# Patient Record
Sex: FEMALE | Employment: OTHER | URBAN - METROPOLITAN AREA
[De-identification: names, ages, dates, MRNs, and addresses within clinical notes are randomized per-mention and may not be internally consistent; named-entity substitution may affect disease eponyms.]

---

## 2018-11-27 ENCOUNTER — RECORDS - HEALTHEAST (OUTPATIENT)
Dept: LAB | Facility: CLINIC | Age: 68
End: 2018-11-27

## 2018-11-27 LAB
ANION GAP SERPL CALCULATED.3IONS-SCNC: 10 MMOL/L (ref 5–18)
BUN SERPL-MCNC: 16 MG/DL (ref 8–22)
CALCIUM SERPL-MCNC: 9.2 MG/DL (ref 8.5–10.5)
CHLORIDE BLD-SCNC: 101 MMOL/L (ref 98–107)
CO2 SERPL-SCNC: 25 MMOL/L (ref 22–31)
CREAT SERPL-MCNC: 1.05 MG/DL (ref 0.6–1.1)
GFR SERPL CREATININE-BSD FRML MDRD: 52 ML/MIN/1.73M2
GLUCOSE BLD-MCNC: 156 MG/DL (ref 70–125)
HGB BLD-MCNC: 10.7 G/DL (ref 12–16)
POTASSIUM BLD-SCNC: 4 MMOL/L (ref 3.5–5)
SODIUM SERPL-SCNC: 136 MMOL/L (ref 136–145)

## 2018-12-10 ENCOUNTER — RECORDS - HEALTHEAST (OUTPATIENT)
Dept: LAB | Facility: CLINIC | Age: 68
End: 2018-12-10

## 2018-12-10 LAB
ALBUMIN UR-MCNC: NEGATIVE MG/DL
APPEARANCE UR: CLEAR
BACTERIA #/AREA URNS HPF: ABNORMAL HPF
BILIRUB UR QL STRIP: NEGATIVE
COLOR UR AUTO: YELLOW
GLUCOSE UR STRIP-MCNC: NEGATIVE MG/DL
HGB UR QL STRIP: NEGATIVE
KETONES UR STRIP-MCNC: NEGATIVE MG/DL
LEUKOCYTE ESTERASE UR QL STRIP: ABNORMAL
NITRATE UR QL: NEGATIVE
PH UR STRIP: 6.5 [PH] (ref 4.5–8)
RBC #/AREA URNS AUTO: ABNORMAL HPF
SP GR UR STRIP: 1.01 (ref 1–1.03)
SQUAMOUS #/AREA URNS AUTO: ABNORMAL LPF
UROBILINOGEN UR STRIP-ACNC: ABNORMAL
WBC #/AREA URNS AUTO: ABNORMAL HPF

## 2018-12-12 LAB — BACTERIA SPEC CULT: NO GROWTH

## 2021-04-28 ENCOUNTER — TELEPHONE (OUTPATIENT)
Dept: NEUROSURGERY | Facility: CLINIC | Age: 71
End: 2021-04-28

## 2021-04-28 NOTE — TELEPHONE ENCOUNTER
Patient states she is a former patient of Dr. Truong from [facility], looking to schedule an appointment for second opinion on a surgery recommendation.

## 2021-05-04 ENCOUNTER — OFFICE VISIT (OUTPATIENT)
Dept: NEUROSURGERY | Facility: CLINIC | Age: 71
End: 2021-05-04
Attending: PHYSICIAN ASSISTANT
Payer: MEDICARE

## 2021-05-04 VITALS
HEART RATE: 84 BPM | BODY MASS INDEX: 44.57 KG/M2 | OXYGEN SATURATION: 98 % | WEIGHT: 227 LBS | DIASTOLIC BLOOD PRESSURE: 80 MMHG | SYSTOLIC BLOOD PRESSURE: 181 MMHG | HEIGHT: 60 IN

## 2021-05-04 DIAGNOSIS — E66.01 MORBID OBESITY (H): ICD-10-CM

## 2021-05-04 DIAGNOSIS — M48.02 CERVICAL SPINAL STENOSIS: Primary | ICD-10-CM

## 2021-05-04 PROCEDURE — 99203 OFFICE O/P NEW LOW 30 MIN: CPT | Performed by: PHYSICIAN ASSISTANT

## 2021-05-04 PROCEDURE — G0463 HOSPITAL OUTPT CLINIC VISIT: HCPCS

## 2021-05-04 RX ORDER — IRON,CARBONYL/ASCORBIC ACID 100-250 MG
1-2 TABLET ORAL
COMMUNITY
End: 2022-01-01

## 2021-05-04 RX ORDER — BUPROPION HYDROCHLORIDE 300 MG/1
300 TABLET ORAL EVERY MORNING
COMMUNITY

## 2021-05-04 RX ORDER — CITALOPRAM HYDROBROMIDE 20 MG/1
40 TABLET ORAL DAILY
COMMUNITY
Start: 2021-01-28

## 2021-05-04 RX ORDER — PRAVASTATIN SODIUM 40 MG
40 TABLET ORAL
COMMUNITY
Start: 2020-09-11 | End: 2022-01-01

## 2021-05-04 RX ORDER — SODIUM FLUORIDE 5 MG/G
GEL, DENTIFRICE DENTAL
COMMUNITY
Start: 2020-07-05

## 2021-05-04 RX ORDER — TRIAMCINOLONE ACETONIDE 1 MG/G
CREAM TOPICAL
COMMUNITY
Start: 2020-10-29 | End: 2022-01-01

## 2021-05-04 RX ORDER — NORETHINDRONE ACETATE 5 MG
TABLET ORAL
COMMUNITY
Start: 2019-12-24 | End: 2022-01-01

## 2021-05-04 RX ORDER — ROSUVASTATIN CALCIUM 5 MG/1
5 TABLET, COATED ORAL DAILY
Status: ON HOLD | COMMUNITY
Start: 2020-10-06 | End: 2022-01-01

## 2021-05-04 RX ORDER — SPIRONOLACTONE 25 MG/1
25 TABLET ORAL DAILY
COMMUNITY
Start: 2020-10-14 | End: 2022-01-01

## 2021-05-04 RX ORDER — OXYCODONE HYDROCHLORIDE 5 MG/1
2.5-5 TABLET ORAL EVERY 6 HOURS PRN
Status: ON HOLD | COMMUNITY
Start: 2021-02-26 | End: 2022-01-01

## 2021-05-04 RX ORDER — FUROSEMIDE 20 MG
40 TABLET ORAL
COMMUNITY
Start: 2021-02-24 | End: 2022-01-01

## 2021-05-04 RX ORDER — NYSTATIN 100000 U/G
CREAM TOPICAL DAILY PRN
COMMUNITY
Start: 2020-07-02

## 2021-05-04 RX ORDER — FLUCONAZOLE 150 MG/1
TABLET ORAL
COMMUNITY
Start: 2021-02-11 | End: 2022-01-01

## 2021-05-04 RX ORDER — LANCETS 30 GAUGE
1 EACH MISCELLANEOUS
COMMUNITY
Start: 2020-10-12

## 2021-05-04 RX ORDER — BUDESONIDE AND FORMOTEROL FUMARATE DIHYDRATE 160; 4.5 UG/1; UG/1
AEROSOL RESPIRATORY (INHALATION)
COMMUNITY
Start: 2020-08-08

## 2021-05-04 RX ORDER — OMEPRAZOLE 40 MG/1
CAPSULE, DELAYED RELEASE ORAL
COMMUNITY
Start: 2021-01-28 | End: 2022-01-01

## 2021-05-04 RX ORDER — ALPRAZOLAM 0.25 MG
0.25 TABLET ORAL
COMMUNITY
End: 2022-01-01

## 2021-05-04 RX ORDER — BLOOD-GLUCOSE METER
EACH MISCELLANEOUS
COMMUNITY
Start: 2020-10-12

## 2021-05-04 RX ORDER — BUPROPION HYDROCHLORIDE 150 MG/1
150 TABLET, EXTENDED RELEASE ORAL
COMMUNITY
Start: 2020-07-15 | End: 2022-01-01

## 2021-05-04 RX ORDER — IBUPROFEN 200 MG
200 TABLET ORAL
COMMUNITY
End: 2022-01-01

## 2021-05-04 RX ORDER — AMOXICILLIN 250 MG
2 CAPSULE ORAL DAILY PRN
COMMUNITY
Start: 2020-11-19

## 2021-05-04 RX ORDER — LEVALBUTEROL TARTRATE 45 UG/1
1-2 AEROSOL, METERED ORAL
COMMUNITY
Start: 2020-08-12 | End: 2022-01-01

## 2021-05-04 RX ORDER — ACETAMINOPHEN 500 MG
1000 TABLET ORAL
COMMUNITY
End: 2022-01-01

## 2021-05-04 RX ORDER — NYSTATIN 100000/ML
175 SUSPENSION, ORAL (FINAL DOSE FORM) ORAL
COMMUNITY
Start: 2021-01-06 | End: 2022-01-01

## 2021-05-04 RX ORDER — DILTIAZEM HYDROCHLORIDE 180 MG/1
180 CAPSULE, EXTENDED RELEASE ORAL DAILY
COMMUNITY
Start: 2021-03-09

## 2021-05-04 RX ORDER — BUPROPION HYDROCHLORIDE 150 MG/1
150 TABLET ORAL EVERY MORNING
Status: ON HOLD | COMMUNITY
End: 2022-01-01

## 2021-05-04 RX ORDER — LEVOTHYROXINE SODIUM 88 UG/1
88 TABLET ORAL DAILY
COMMUNITY
Start: 2020-05-19

## 2021-05-04 RX ORDER — FLUOCINONIDE TOPICAL SOLUTION USP, 0.05% 0.5 MG/ML
SOLUTION TOPICAL
COMMUNITY
Start: 2021-01-06 | End: 2022-01-01

## 2021-05-04 RX ORDER — BLOOD SUGAR DIAGNOSTIC
STRIP MISCELLANEOUS
COMMUNITY
Start: 2020-10-16

## 2021-05-04 RX ORDER — TRAZODONE HYDROCHLORIDE 50 MG/1
50-100 TABLET, FILM COATED ORAL AT BEDTIME
Status: ON HOLD | COMMUNITY
Start: 2020-05-19 | End: 2022-01-01

## 2021-05-04 RX ORDER — PYRIDOXINE HCL (VITAMIN B6) 50 MG
1 TABLET ORAL
COMMUNITY
End: 2022-01-01

## 2021-05-04 RX ORDER — TOPIRAMATE 50 MG/1
50 TABLET, FILM COATED ORAL 3 TIMES DAILY
COMMUNITY
Start: 2021-03-02

## 2021-05-04 RX ORDER — FLUTICASONE PROPIONATE 50 MCG
SPRAY, SUSPENSION (ML) NASAL
COMMUNITY
Start: 2020-11-02

## 2021-05-04 RX ORDER — INSULIN LISPRO 100 [IU]/ML
INJECTION, SOLUTION INTRAVENOUS; SUBCUTANEOUS
Status: ON HOLD | COMMUNITY
Start: 2020-10-12 | End: 2022-01-01

## 2021-05-04 ASSESSMENT — MIFFLIN-ST. JEOR: SCORE: 1461.17

## 2021-05-04 ASSESSMENT — PAIN SCALES - GENERAL: PAINLEVEL: MODERATE PAIN (4)

## 2021-05-04 NOTE — PROGRESS NOTES
Neurosurgery Consult    HPI    Ms. Cordon is a 71-year-old female with previously undergone lumbar surgery with Dr. Truong in 2019 and is very pleased with the results.  She is here today for evaluation of cervical MRI that demonstrated spinal stenosis and possible T2 signal change at C3-4.  She had presented to the emergency department with left-sided chest pain and arm pain and eventually had an MRI which demonstrated increased spinal stenosis.  She today reports she does drop things more frequently with her left hand.  She reports some pain going down her left arm generalized distribution.  She did get some pain into her first and second digits on the left.  She denies loss of coordination of her feet but does report some abnormal sensation in the balls of her feet.    Medical history  She has bleeding and clotting issues and is currently taking Eliquis  Congestive heart failure  Lumbar spinal stenosis  History of stroke  Type 2 diabetes  Hypothyroidism  Pulmonary embolism      Social history        B/P: 181/80, T: Data Unavailable, P: 84, R: Data Unavailable       Exam    Alert and oriented no acute distress  Bilateral upper extremities with 5/5 strength  Sherley sign negative  Reflexes 2+ triceps, biceps, brachioradialis    Bilateral lower extremities with 5/5 strength  Reflexes absent patella/ankle  Negative straight leg raise bilaterally  Negative ankle clonus negative Babinski bilaterally  Gait is normal    Imaging    Cervical MRI report from February 27, 2021 demonstrates    1. Comparison to examination of 08/31/2018.  2. Progression of spinal canal stenosis at C3-4 with more marked cord flattening. Subtle new T2 and STIR signal hyperintensity within the cord at the C3-4 level may reflect minimal cord gliosis.  3. Progression of now severe right neural foraminal stenosis at C3-4. Correlate with right C3 radicular symptoms.  4. Progression of degenerative changes at C7-T1 with now severe left neural  foraminal stenosis. Correlate with new left C8 radicular symptoms.  5. Additional degenerative changes as above.    Images not currently available for review    Assessment    Spinal stenosis with possible cord signal change, no overt signs of myelopathy on exam    Plan:      Patient has significant spinal stenosis on her images, I would recommend that she come into clinic on a day when she can meet with one of our surgeons for further evaluation and discussion.    Total time of 30 minutes spent with the patient today in counseling and coordination of care.

## 2021-05-06 ENCOUNTER — TELEPHONE (OUTPATIENT)
Dept: NEUROSURGERY | Facility: CLINIC | Age: 71
End: 2021-05-06

## 2021-05-06 NOTE — TELEPHONE ENCOUNTER
Patient called to see if her imaging from [facility] has arrived.  Patient is bringing their copy of the imaing disk that was done back in February to the clinic.

## 2021-05-07 PROBLEM — E66.01 MORBID OBESITY (H): Status: ACTIVE | Noted: 2021-05-07

## 2021-05-07 NOTE — TELEPHONE ENCOUNTER
"Attempted to reach out to patient, no answer. Left voice message for patient to call clinic back to further discuss.           Per Alethea Arechiga PA-C :\"Please let the patient know that I reviewed her MRI it does show spinal stenosis I would recommend she meet with one of our surgeons.  She would like to meet with Dr. Truong she can evaluate him if she would like to meet with someone sooner we can try and set up an appointment soon.  Thank you \"  "

## 2021-05-10 ENCOUNTER — TELEPHONE (OUTPATIENT)
Dept: NEUROSURGERY | Facility: CLINIC | Age: 71
End: 2021-05-10

## 2021-05-10 NOTE — TELEPHONE ENCOUNTER
Patient would like to discuss about new symptoms she is having.  A former surgical patient of Dr. Truong.

## 2021-05-10 NOTE — TELEPHONE ENCOUNTER
Last Visit: 5/4/21 Office visit with   Next Visit: None    Name of Provider: Alethea Arechiga PA-C  Assessment: Pt denies having any new symptoms, states 'same old' when asked about her message left earlier with the front. Her main concern was that she would like to get in to see Dr. Truong for an appointment. She was told to wait until the 17th when he returns to the office for him to speak with Alethea Arechiga PA-C before she could make an appointment. However in Alethea's last note he discussed the results of her MRI and plan for her to meet with a surgeon.         Recommendation given: Writer will follow up with scheduling staff to let her know how to proceed.     Action needed from provider: APRIL

## 2021-05-10 NOTE — TELEPHONE ENCOUNTER
Please let the patient know that I reviewed her MRI it does show spinal stenosis I would recommend she meet with one of our surgeons.  She would like to meet with Dr. Truong she can evaluate him if she would like to meet with someone sooner we can try and set up an appointment soon.  Thank you     Called with the above and she will call and wants and appointment with Dr Truong.

## 2021-05-25 ENCOUNTER — OFFICE VISIT (OUTPATIENT)
Dept: NEUROSURGERY | Facility: CLINIC | Age: 71
End: 2021-05-25
Attending: NEUROLOGICAL SURGERY
Payer: MEDICARE

## 2021-05-25 VITALS
DIASTOLIC BLOOD PRESSURE: 78 MMHG | WEIGHT: 230 LBS | HEART RATE: 107 BPM | HEIGHT: 60 IN | BODY MASS INDEX: 45.16 KG/M2 | SYSTOLIC BLOOD PRESSURE: 143 MMHG | OXYGEN SATURATION: 97 %

## 2021-05-25 DIAGNOSIS — M48.02 CERVICAL SPINAL STENOSIS: Primary | ICD-10-CM

## 2021-05-25 PROCEDURE — 99215 OFFICE O/P EST HI 40 MIN: CPT | Performed by: NEUROLOGICAL SURGERY

## 2021-05-25 PROCEDURE — G0463 HOSPITAL OUTPT CLINIC VISIT: HCPCS

## 2021-05-25 ASSESSMENT — MIFFLIN-ST. JEOR: SCORE: 1474.77

## 2021-05-25 NOTE — PROGRESS NOTES
Ms. Cordon is a 71-year-old woman well-known to me from prior lumbar surgery who seen today because of concern regarding recent episode of chest pain associated with left upper extremity symptoms and subsequent MRI demonstration of high-grade cervical spinal stenosis at C3-4 with possible signal change within the spinal cord at that level.  She also suffers from advanced multilevel cervical spondylitic change with significant cervical hyperlordosis but no other source of high-grade spinal stenosis or cord impingement at present.    Her presenting symptoms have improved or resolved.  She recently underwent a cervical MRI scan and describes muscle spasms in the left side of her body with prolonged supine positioning, possibly suggesting cord impingement in that posterior which he was unable to tolerate.    On examination today she is awake alert and oriented x3 with memory intact for recent and remote events and speech clear in character and content.  She has suffered a series of strokes reportedly and states that this has had an effect on cognitive impairment as well as her mobility.  She is chronically anticoagulated because of concern regarding her vascular disease.    On examination, she is awake alert and oriented x3 with memory intact for recent and remote events and speech clear in character and content.  There are no focal cranial nerve abnormalities identified.  There is no facial asymmetry.  Cervical range of motion is appropriate for age and condition without Lhermitte's phenomenon.  Motor examination of her bilateral upper extremities shows 4/5 strength including deltoids, biceps, triceps and brachioradialis bilaterally.  Sensory examination is grossly intact to light touch throughout.  Deep tendon reflex testing is significantly increased in her left upper extremity with prolonged clonus noted over the triceps and biceps muscle.  Reflexes are slightly diminished on the right side without clonus.  There is  no evidence of hand ataxia and intrinsic hand musculature is intact with the possible exception of ulnar innervated musculature on the left hand.  Deep tendon reflex testing lower extremities is markedly reduced and symmetric bilaterally at the knees and ankles.  There is no ankle clonus noted and toes are moved with plantar stimulation.    Patient is ambulatory with the aid of a walker.  I did not attempt to have her walk or test her lower extremity strength while standing.    Cervical MRI scan results are summarized above.  She has advanced multilevel cervical spondylitic change with marked hyperlordosis and a moderately narrowed spinal canal throughout.  However, at the C3-4 level she has a modest midline broad-based disc bulge and osteophyte associated with some ligamentous hypertrophy posteriorly which produces critical canal stenosis.  There may be diffusely increased signal through the spinal cord at this level as well.  No comparison MRI studies are available for review unfortunately.  Radiology report however suggests that there has been some progression in the degree of spinal stenosis at the C3-4 level over the past year.    Assessment: Minimally symptomatic high-grade cervical spinal stenosis in this fragile appearing and somewhat debilitated patient on chronic anticoagulation.  I have reviewed the clinical and radiographic findings in substantial detail with the patient, her  and friend all of whom are also present throughout the course the interview and examination.  I had a lengthy discussion with her regarding management alternatives for this cervical stenosis including anterior cervical decompression and stabilization.  This would of course require cessation of her anticoagulation and delayed resumption for at least a week subsequent to surgery.  This procedure would also require about 2-1/2 hours of general anesthesia.  We discussed the risks of continuing neurologic dysfunction secondary to  constrictive cervical stenosis and progressive myelopathy as well as the potential for catastrophic neurologic deterioration should she suffer a fall or be involved in a car accident.  I explained to her that the surgery at C3-4 would largely be prophylactic to try and prevent additional injury at that level in the future.  It is quite clear also that she would be at substantial medical risk if her anticoagulation was stopped and she underwent a significant surgical procedure.  The possibility of another stroke is of particular concern.    We agreed that evaluating these opposing risks is quite difficult.  I had a lengthy discussion with the patient and her family members regarding this problem.  I told them that my perception is that the medical complications of surgery in the perioperative period are likely greater then the risks associated with unresolved cervical spinal stenosis.  Ms. Cordon wishes to consider the options presented and agreed to call or return to the office if she has questions, wishes to go over this again or should she decide to proceed with cervical surgery.  We also spoke at length regarding commonsense limitations and restrictions which might provide some degree of safety in terms of cervical injury.    Approximately 40 minutes was spent direct contact with the patient the majority reviewing the clinical and radiographic findings, management alternatives, risks and benefits.

## 2021-07-04 ENCOUNTER — HEALTH MAINTENANCE LETTER (OUTPATIENT)
Age: 71
End: 2021-07-04

## 2021-10-24 ENCOUNTER — HEALTH MAINTENANCE LETTER (OUTPATIENT)
Age: 71
End: 2021-10-24

## 2022-01-01 ENCOUNTER — DOCUMENTATION ONLY (OUTPATIENT)
Dept: OTHER | Facility: CLINIC | Age: 72
End: 2022-01-01

## 2022-01-01 ENCOUNTER — LAB REQUISITION (OUTPATIENT)
Dept: LAB | Facility: CLINIC | Age: 72
End: 2022-01-01
Payer: MEDICARE

## 2022-01-01 ENCOUNTER — HEALTH MAINTENANCE LETTER (OUTPATIENT)
Age: 72
End: 2022-01-01

## 2022-01-01 ENCOUNTER — HOSPITAL ENCOUNTER (INPATIENT)
Facility: CLINIC | Age: 72
LOS: 7 days | Discharge: HOSPICE | DRG: 175 | End: 2022-12-21
Attending: EMERGENCY MEDICINE | Admitting: HOSPITALIST
Payer: MEDICARE

## 2022-01-01 ENCOUNTER — APPOINTMENT (OUTPATIENT)
Dept: ULTRASOUND IMAGING | Facility: CLINIC | Age: 72
DRG: 175 | End: 2022-01-01
Attending: HOSPITALIST
Payer: MEDICARE

## 2022-01-01 ENCOUNTER — APPOINTMENT (OUTPATIENT)
Dept: CT IMAGING | Facility: CLINIC | Age: 72
DRG: 175 | End: 2022-01-01
Attending: INTERNAL MEDICINE
Payer: MEDICARE

## 2022-01-01 ENCOUNTER — APPOINTMENT (OUTPATIENT)
Dept: CT IMAGING | Facility: CLINIC | Age: 72
DRG: 175 | End: 2022-01-01
Attending: EMERGENCY MEDICINE
Payer: MEDICARE

## 2022-01-01 ENCOUNTER — APPOINTMENT (OUTPATIENT)
Dept: CARDIOLOGY | Facility: CLINIC | Age: 72
DRG: 175 | End: 2022-01-01
Attending: HOSPITALIST
Payer: MEDICARE

## 2022-01-01 ENCOUNTER — APPOINTMENT (OUTPATIENT)
Dept: PHYSICAL THERAPY | Facility: CLINIC | Age: 72
DRG: 175 | End: 2022-01-01
Attending: HOSPITALIST
Payer: MEDICARE

## 2022-01-01 ENCOUNTER — APPOINTMENT (OUTPATIENT)
Dept: MRI IMAGING | Facility: CLINIC | Age: 72
DRG: 175 | End: 2022-01-01
Attending: EMERGENCY MEDICINE
Payer: MEDICARE

## 2022-01-01 ENCOUNTER — PATIENT OUTREACH (OUTPATIENT)
Dept: CARE COORDINATION | Facility: CLINIC | Age: 72
End: 2022-01-01

## 2022-01-01 VITALS
TEMPERATURE: 98.5 F | WEIGHT: 173.92 LBS | HEART RATE: 78 BPM | DIASTOLIC BLOOD PRESSURE: 74 MMHG | OXYGEN SATURATION: 96 % | BODY MASS INDEX: 34.15 KG/M2 | SYSTOLIC BLOOD PRESSURE: 135 MMHG | HEIGHT: 60 IN | RESPIRATION RATE: 18 BRPM

## 2022-01-01 DIAGNOSIS — R53.1 GENERALIZED WEAKNESS: ICD-10-CM

## 2022-01-01 DIAGNOSIS — Z66 DNR (DO NOT RESUSCITATE): ICD-10-CM

## 2022-01-01 DIAGNOSIS — Z91.148 NONCOMPLIANCE WITH MEDICATION REGIMEN: ICD-10-CM

## 2022-01-01 DIAGNOSIS — Z79.4 TYPE 2 DIABETES MELLITUS WITH DIABETIC AUTONOMIC NEUROPATHY, WITH LONG-TERM CURRENT USE OF INSULIN (H): Primary | ICD-10-CM

## 2022-01-01 DIAGNOSIS — R11.0 NAUSEA: ICD-10-CM

## 2022-01-01 DIAGNOSIS — E11.43 TYPE 2 DIABETES MELLITUS WITH DIABETIC AUTONOMIC NEUROPATHY, WITH LONG-TERM CURRENT USE OF INSULIN (H): Primary | ICD-10-CM

## 2022-01-01 DIAGNOSIS — Z51.5 HOSPICE CARE PATIENT: ICD-10-CM

## 2022-01-01 DIAGNOSIS — N18.9 CHRONIC RENAL FAILURE, UNSPECIFIED CKD STAGE: ICD-10-CM

## 2022-01-01 DIAGNOSIS — I26.02 ACUTE SADDLE PULMONARY EMBOLISM WITH ACUTE COR PULMONALE (H): ICD-10-CM

## 2022-01-01 DIAGNOSIS — R05.9 COUGH, UNSPECIFIED: ICD-10-CM

## 2022-01-01 DIAGNOSIS — G20.A1 PARKINSON DISEASE (H): ICD-10-CM

## 2022-01-01 LAB
ALBUMIN SERPL-MCNC: 2.2 G/DL (ref 3.4–5)
ALBUMIN SERPL-MCNC: 2.8 G/DL (ref 3.4–5)
ALBUMIN UR-MCNC: NEGATIVE MG/DL
ALLEN'S TEST: YES
ALP SERPL-CCNC: 101 U/L (ref 40–150)
ALP SERPL-CCNC: 173 U/L (ref 40–150)
ALT SERPL W P-5'-P-CCNC: 11 U/L (ref 0–50)
ALT SERPL W P-5'-P-CCNC: 7 U/L (ref 0–50)
AMMONIA PLAS-SCNC: <10 UMOL/L (ref 10–50)
ANION GAP SERPL CALCULATED.3IONS-SCNC: 13 MMOL/L (ref 3–14)
ANION GAP SERPL CALCULATED.3IONS-SCNC: 13 MMOL/L (ref 3–14)
ANION GAP SERPL CALCULATED.3IONS-SCNC: 7 MMOL/L (ref 3–14)
ANION GAP SERPL CALCULATED.3IONS-SCNC: 8 MMOL/L (ref 3–14)
APPEARANCE UR: CLEAR
APTT PPP: 173 SECONDS (ref 22–38)
APTT PPP: 28 SECONDS (ref 22–38)
APTT PPP: 49 SECONDS (ref 22–38)
APTT PPP: 51 SECONDS (ref 22–38)
APTT PPP: 56 SECONDS (ref 22–38)
APTT PPP: 60 SECONDS (ref 22–38)
APTT PPP: >240 SECONDS (ref 22–38)
AST SERPL W P-5'-P-CCNC: 21 U/L (ref 0–45)
AST SERPL W P-5'-P-CCNC: 36 U/L (ref 0–45)
ATRIAL RATE - MUSE: 91 BPM
BASE EXCESS BLDA CALC-SCNC: 0.5 MMOL/L (ref -9–1.8)
BASOPHILS # BLD AUTO: 0 10E3/UL (ref 0–0.2)
BASOPHILS NFR BLD AUTO: 0 %
BILIRUB SERPL-MCNC: 0.4 MG/DL (ref 0.2–1.3)
BILIRUB SERPL-MCNC: 1.3 MG/DL (ref 0.2–1.3)
BILIRUB UR QL STRIP: NEGATIVE
BUN SERPL-MCNC: 11 MG/DL (ref 7–30)
BUN SERPL-MCNC: 13 MG/DL (ref 7–30)
BUN SERPL-MCNC: 23 MG/DL (ref 7–30)
BUN SERPL-MCNC: 37 MG/DL (ref 7–30)
CALCIUM SERPL-MCNC: 8.5 MG/DL (ref 8.5–10.1)
CALCIUM SERPL-MCNC: 9.5 MG/DL (ref 8.5–10.1)
CHLORIDE BLD-SCNC: 106 MMOL/L (ref 94–109)
CHLORIDE BLD-SCNC: 96 MMOL/L (ref 94–109)
CO2 SERPL-SCNC: 22 MMOL/L (ref 20–32)
CO2 SERPL-SCNC: 23 MMOL/L (ref 20–32)
CO2 SERPL-SCNC: 23 MMOL/L (ref 20–32)
CO2 SERPL-SCNC: 25 MMOL/L (ref 20–32)
COLOR UR AUTO: YELLOW
CREAT SERPL-MCNC: 0.89 MG/DL (ref 0.52–1.04)
CREAT SERPL-MCNC: 0.89 MG/DL (ref 0.52–1.04)
CREAT SERPL-MCNC: 0.92 MG/DL (ref 0.52–1.04)
CREAT SERPL-MCNC: 1.14 MG/DL (ref 0.52–1.04)
D DIMER PPP FEU-MCNC: 17.35 UG/ML FEU (ref 0–0.5)
DIASTOLIC BLOOD PRESSURE - MUSE: NORMAL MMHG
ELLIPTOCYTES BLD QL SMEAR: SLIGHT
EOSINOPHIL # BLD AUTO: 0 10E3/UL (ref 0–0.7)
EOSINOPHIL NFR BLD AUTO: 0 %
ERYTHROCYTE [DISTWIDTH] IN BLOOD BY AUTOMATED COUNT: 16 % (ref 10–15)
ERYTHROCYTE [DISTWIDTH] IN BLOOD BY AUTOMATED COUNT: 16.4 % (ref 10–15)
ERYTHROCYTE [DISTWIDTH] IN BLOOD BY AUTOMATED COUNT: 16.4 % (ref 10–15)
ERYTHROCYTE [DISTWIDTH] IN BLOOD BY AUTOMATED COUNT: 16.7 % (ref 10–15)
ERYTHROCYTE [DISTWIDTH] IN BLOOD BY AUTOMATED COUNT: 17.3 % (ref 10–15)
GAMMA INTERFERON BACKGROUND BLD IA-ACNC: 2.36 IU/ML
GFR SERPL CREATININE-BSD FRML MDRD: 51 ML/MIN/1.73M2
GFR SERPL CREATININE-BSD FRML MDRD: 65 ML/MIN/1.73M2
GFR SERPL CREATININE-BSD FRML MDRD: 68 ML/MIN/1.73M2
GFR SERPL CREATININE-BSD FRML MDRD: 68 ML/MIN/1.73M2
GLUCOSE BLD-MCNC: 111 MG/DL (ref 70–99)
GLUCOSE BLD-MCNC: 125 MG/DL (ref 70–99)
GLUCOSE BLD-MCNC: 133 MG/DL (ref 70–99)
GLUCOSE BLD-MCNC: 274 MG/DL (ref 70–99)
GLUCOSE BLDC GLUCOMTR-MCNC: 102 MG/DL (ref 70–99)
GLUCOSE BLDC GLUCOMTR-MCNC: 106 MG/DL (ref 70–99)
GLUCOSE BLDC GLUCOMTR-MCNC: 131 MG/DL (ref 70–99)
GLUCOSE BLDC GLUCOMTR-MCNC: 136 MG/DL (ref 70–99)
GLUCOSE BLDC GLUCOMTR-MCNC: 138 MG/DL (ref 70–99)
GLUCOSE BLDC GLUCOMTR-MCNC: 138 MG/DL (ref 70–99)
GLUCOSE BLDC GLUCOMTR-MCNC: 139 MG/DL (ref 70–99)
GLUCOSE BLDC GLUCOMTR-MCNC: 143 MG/DL (ref 70–99)
GLUCOSE BLDC GLUCOMTR-MCNC: 146 MG/DL (ref 70–99)
GLUCOSE BLDC GLUCOMTR-MCNC: 154 MG/DL (ref 70–99)
GLUCOSE BLDC GLUCOMTR-MCNC: 155 MG/DL (ref 70–99)
GLUCOSE BLDC GLUCOMTR-MCNC: 155 MG/DL (ref 70–99)
GLUCOSE BLDC GLUCOMTR-MCNC: 158 MG/DL (ref 70–99)
GLUCOSE BLDC GLUCOMTR-MCNC: 175 MG/DL (ref 70–99)
GLUCOSE BLDC GLUCOMTR-MCNC: 177 MG/DL (ref 70–99)
GLUCOSE BLDC GLUCOMTR-MCNC: 187 MG/DL (ref 70–99)
GLUCOSE BLDC GLUCOMTR-MCNC: 194 MG/DL (ref 70–99)
GLUCOSE BLDC GLUCOMTR-MCNC: 194 MG/DL (ref 70–99)
GLUCOSE BLDC GLUCOMTR-MCNC: 200 MG/DL (ref 70–99)
GLUCOSE BLDC GLUCOMTR-MCNC: 202 MG/DL (ref 70–99)
GLUCOSE BLDC GLUCOMTR-MCNC: 202 MG/DL (ref 70–99)
GLUCOSE BLDC GLUCOMTR-MCNC: 204 MG/DL (ref 70–99)
GLUCOSE BLDC GLUCOMTR-MCNC: 209 MG/DL (ref 70–99)
GLUCOSE BLDC GLUCOMTR-MCNC: 214 MG/DL (ref 70–99)
GLUCOSE BLDC GLUCOMTR-MCNC: 221 MG/DL (ref 70–99)
GLUCOSE BLDC GLUCOMTR-MCNC: 221 MG/DL (ref 70–99)
GLUCOSE BLDC GLUCOMTR-MCNC: 223 MG/DL (ref 70–99)
GLUCOSE BLDC GLUCOMTR-MCNC: 233 MG/DL (ref 70–99)
GLUCOSE BLDC GLUCOMTR-MCNC: 235 MG/DL (ref 70–99)
GLUCOSE BLDC GLUCOMTR-MCNC: 238 MG/DL (ref 70–99)
GLUCOSE BLDC GLUCOMTR-MCNC: 258 MG/DL (ref 70–99)
GLUCOSE BLDC GLUCOMTR-MCNC: 97 MG/DL (ref 70–99)
GLUCOSE UR STRIP-MCNC: NEGATIVE MG/DL
HCO3 BLD-SCNC: 25 MMOL/L (ref 21–28)
HCT VFR BLD AUTO: 31.6 % (ref 35–47)
HCT VFR BLD AUTO: 31.6 % (ref 35–47)
HCT VFR BLD AUTO: 32.7 % (ref 35–47)
HCT VFR BLD AUTO: 33 % (ref 35–47)
HCT VFR BLD AUTO: 42.6 % (ref 35–47)
HGB BLD-MCNC: 10 G/DL (ref 11.7–15.7)
HGB BLD-MCNC: 10.1 G/DL (ref 11.7–15.7)
HGB BLD-MCNC: 10.3 G/DL (ref 11.7–15.7)
HGB BLD-MCNC: 10.5 G/DL (ref 11.7–15.7)
HGB BLD-MCNC: 13.2 G/DL (ref 11.7–15.7)
HGB UR QL STRIP: NEGATIVE
HOLD SPECIMEN: NORMAL
IMM GRANULOCYTES # BLD: 0.2 10E3/UL
IMM GRANULOCYTES NFR BLD: 2 %
INR PPP: 1.28 (ref 0.85–1.15)
INTERPRETATION ECG - MUSE: NORMAL
KETONES UR STRIP-MCNC: NEGATIVE MG/DL
LEUKOCYTE ESTERASE UR QL STRIP: NEGATIVE
LVEF ECHO: NORMAL
LYMPHOCYTES # BLD AUTO: 0.8 10E3/UL (ref 0.8–5.3)
LYMPHOCYTES NFR BLD AUTO: 8 %
M TB IFN-G BLD-IMP: ABNORMAL
M TB IFN-G CD4+ BCKGRND COR BLD-ACNC: -1.33 IU/ML
MCH RBC QN AUTO: 25.5 PG (ref 26.5–33)
MCH RBC QN AUTO: 25.5 PG (ref 26.5–33)
MCH RBC QN AUTO: 25.6 PG (ref 26.5–33)
MCH RBC QN AUTO: 25.7 PG (ref 26.5–33)
MCH RBC QN AUTO: 25.9 PG (ref 26.5–33)
MCHC RBC AUTO-ENTMCNC: 31 G/DL (ref 31.5–36.5)
MCHC RBC AUTO-ENTMCNC: 31.5 G/DL (ref 31.5–36.5)
MCHC RBC AUTO-ENTMCNC: 31.6 G/DL (ref 31.5–36.5)
MCHC RBC AUTO-ENTMCNC: 31.8 G/DL (ref 31.5–36.5)
MCHC RBC AUTO-ENTMCNC: 32 G/DL (ref 31.5–36.5)
MCV RBC AUTO: 81 FL (ref 78–100)
MCV RBC AUTO: 82 FL (ref 78–100)
MITOGEN IGNF BCKGRD COR BLD-ACNC: -1.54 IU/ML
MITOGEN IGNF BCKGRD COR BLD-ACNC: -1.84 IU/ML
MONOCYTES # BLD AUTO: 0.5 10E3/UL (ref 0–1.3)
MONOCYTES NFR BLD AUTO: 5 %
MUCOUS THREADS #/AREA URNS LPF: PRESENT /LPF
NEUTROPHILS # BLD AUTO: 8 10E3/UL (ref 1.6–8.3)
NEUTROPHILS NFR BLD AUTO: 85 %
NITRATE UR QL: NEGATIVE
NRBC # BLD AUTO: 0 10E3/UL
NRBC BLD AUTO-RTO: 0 /100
NT-PROBNP SERPL-MCNC: 9445 PG/ML (ref 0–900)
O2/TOTAL GAS SETTING VFR VENT: 21 %
P AXIS - MUSE: 49 DEGREES
PCO2 BLD: 38 MM HG (ref 35–45)
PF4 HEPARIN CMPLX AB SER QL: NEGATIVE
PH BLD: 7.42 [PH] (ref 7.35–7.45)
PH UR STRIP: 8 [PH] (ref 5–7)
PLAT MORPH BLD: ABNORMAL
PLATELET # BLD AUTO: 152 10E3/UL (ref 150–450)
PLATELET # BLD AUTO: 174 10E3/UL (ref 150–450)
PLATELET # BLD AUTO: 187 10E3/UL (ref 150–450)
PLATELET # BLD AUTO: 192 10E3/UL (ref 150–450)
PLATELET # BLD AUTO: 221 10E3/UL (ref 150–450)
PLATELET # BLD AUTO: 98 10E3/UL (ref 150–450)
PO2 BLD: 65 MM HG (ref 80–105)
POTASSIUM BLD-SCNC: 3 MMOL/L (ref 3.4–5.3)
POTASSIUM BLD-SCNC: 3.2 MMOL/L (ref 3.4–5.3)
POTASSIUM BLD-SCNC: 3.5 MMOL/L (ref 3.4–5.3)
POTASSIUM BLD-SCNC: 3.8 MMOL/L (ref 3.4–5.3)
POTASSIUM BLD-SCNC: 3.8 MMOL/L (ref 3.4–5.3)
POTASSIUM BLD-SCNC: 3.9 MMOL/L (ref 3.4–5.3)
POTASSIUM BLD-SCNC: 4.1 MMOL/L (ref 3.4–5.3)
POTASSIUM BLD-SCNC: 4.1 MMOL/L (ref 3.4–5.3)
POTASSIUM BLD-SCNC: 4.3 MMOL/L (ref 3.4–5.3)
PR INTERVAL - MUSE: 154 MS
PROT SERPL-MCNC: 4.9 G/DL (ref 6.8–8.8)
PROT SERPL-MCNC: 6.7 G/DL (ref 6.8–8.8)
QRS DURATION - MUSE: 84 MS
QT - MUSE: 392 MS
QTC - MUSE: 482 MS
QUANTIFERON MITOGEN: 1.03 IU/ML
QUANTIFERON NIL TUBE: 2.36 IU/ML
QUANTIFERON TB1 TUBE: 0.52 IU/ML
QUANTIFERON TB2 TUBE: 0.82
R AXIS - MUSE: -13 DEGREES
RADIOLOGIST FLAGS: ABNORMAL
RBC # BLD AUTO: 3.89 10E6/UL (ref 3.8–5.2)
RBC # BLD AUTO: 3.9 10E6/UL (ref 3.8–5.2)
RBC # BLD AUTO: 4.04 10E6/UL (ref 3.8–5.2)
RBC # BLD AUTO: 4.1 10E6/UL (ref 3.8–5.2)
RBC # BLD AUTO: 5.17 10E6/UL (ref 3.8–5.2)
RBC MORPH BLD: ABNORMAL
RBC URINE: 1 /HPF
SODIUM SERPL-SCNC: 134 MMOL/L (ref 133–144)
SODIUM SERPL-SCNC: 136 MMOL/L (ref 133–144)
SODIUM SERPL-SCNC: 136 MMOL/L (ref 133–144)
SODIUM SERPL-SCNC: 142 MMOL/L (ref 133–144)
SP GR UR STRIP: 1.01 (ref 1–1.03)
SYSTOLIC BLOOD PRESSURE - MUSE: NORMAL MMHG
T AXIS - MUSE: 41 DEGREES
TROPONIN I SERPL HS-MCNC: 1093 NG/L
TROPONIN I SERPL HS-MCNC: 1289 NG/L
TROPONIN I SERPL HS-MCNC: 854 NG/L
TSH SERPL DL<=0.005 MIU/L-ACNC: 2.9 MU/L (ref 0.4–4)
UFH PPP CHRO-ACNC: >1.1 IU/ML
UFH PPP CHRO-ACNC: >1.1 IU/ML
UROBILINOGEN UR STRIP-MCNC: 2 MG/DL
VENTRICULAR RATE- MUSE: 91 BPM
WBC # BLD AUTO: 5.5 10E3/UL (ref 4–11)
WBC # BLD AUTO: 5.5 10E3/UL (ref 4–11)
WBC # BLD AUTO: 6.3 10E3/UL (ref 4–11)
WBC # BLD AUTO: 7 10E3/UL (ref 4–11)
WBC # BLD AUTO: 9.5 10E3/UL (ref 4–11)
WBC URINE: 1 /HPF

## 2022-01-01 PROCEDURE — 84132 ASSAY OF SERUM POTASSIUM: CPT | Performed by: INTERNAL MEDICINE

## 2022-01-01 PROCEDURE — 250N000011 HC RX IP 250 OP 636: Performed by: HOSPITALIST

## 2022-01-01 PROCEDURE — 250N000013 HC RX MED GY IP 250 OP 250 PS 637: Performed by: INTERNAL MEDICINE

## 2022-01-01 PROCEDURE — 36600 WITHDRAWAL OF ARTERIAL BLOOD: CPT

## 2022-01-01 PROCEDURE — 250N000011 HC RX IP 250 OP 636: Performed by: EMERGENCY MEDICINE

## 2022-01-01 PROCEDURE — 210N000002 HC R&B HEART CARE

## 2022-01-01 PROCEDURE — 96361 HYDRATE IV INFUSION ADD-ON: CPT

## 2022-01-01 PROCEDURE — 86481 TB AG RESPONSE T-CELL SUSP: CPT | Performed by: NURSE PRACTITIONER

## 2022-01-01 PROCEDURE — C9113 INJ PANTOPRAZOLE SODIUM, VIA: HCPCS | Performed by: HOSPITALIST

## 2022-01-01 PROCEDURE — 82140 ASSAY OF AMMONIA: CPT | Performed by: INTERNAL MEDICINE

## 2022-01-01 PROCEDURE — 36415 COLL VENOUS BLD VENIPUNCTURE: CPT | Performed by: HOSPITALIST

## 2022-01-01 PROCEDURE — 85730 THROMBOPLASTIN TIME PARTIAL: CPT | Performed by: HOSPITALIST

## 2022-01-01 PROCEDURE — 84132 ASSAY OF SERUM POTASSIUM: CPT | Performed by: HOSPITALIST

## 2022-01-01 PROCEDURE — 250N000013 HC RX MED GY IP 250 OP 250 PS 637: Performed by: HOSPITALIST

## 2022-01-01 PROCEDURE — 85610 PROTHROMBIN TIME: CPT | Performed by: EMERGENCY MEDICINE

## 2022-01-01 PROCEDURE — 93005 ELECTROCARDIOGRAM TRACING: CPT

## 2022-01-01 PROCEDURE — 81003 URINALYSIS AUTO W/O SCOPE: CPT | Performed by: INTERNAL MEDICINE

## 2022-01-01 PROCEDURE — 83880 ASSAY OF NATRIURETIC PEPTIDE: CPT | Performed by: EMERGENCY MEDICINE

## 2022-01-01 PROCEDURE — 250N000009 HC RX 250: Performed by: EMERGENCY MEDICINE

## 2022-01-01 PROCEDURE — 84484 ASSAY OF TROPONIN QUANT: CPT | Performed by: HOSPITALIST

## 2022-01-01 PROCEDURE — 85730 THROMBOPLASTIN TIME PARTIAL: CPT | Performed by: INTERNAL MEDICINE

## 2022-01-01 PROCEDURE — 99239 HOSP IP/OBS DSCHRG MGMT >30: CPT | Performed by: INTERNAL MEDICINE

## 2022-01-01 PROCEDURE — 99233 SBSQ HOSP IP/OBS HIGH 50: CPT | Performed by: INTERNAL MEDICINE

## 2022-01-01 PROCEDURE — 85014 HEMATOCRIT: CPT | Performed by: INTERNAL MEDICINE

## 2022-01-01 PROCEDURE — 85379 FIBRIN DEGRADATION QUANT: CPT | Performed by: EMERGENCY MEDICINE

## 2022-01-01 PROCEDURE — 99223 1ST HOSP IP/OBS HIGH 75: CPT | Mod: AI | Performed by: HOSPITALIST

## 2022-01-01 PROCEDURE — G1010 CDSM STANSON: HCPCS

## 2022-01-01 PROCEDURE — 86022 PLATELET ANTIBODIES: CPT | Performed by: INTERNAL MEDICINE

## 2022-01-01 PROCEDURE — 84443 ASSAY THYROID STIM HORMONE: CPT | Performed by: EMERGENCY MEDICINE

## 2022-01-01 PROCEDURE — 85520 HEPARIN ASSAY: CPT | Performed by: INTERNAL MEDICINE

## 2022-01-01 PROCEDURE — 36415 COLL VENOUS BLD VENIPUNCTURE: CPT | Performed by: INTERNAL MEDICINE

## 2022-01-01 PROCEDURE — 258N000003 HC RX IP 258 OP 636: Performed by: EMERGENCY MEDICINE

## 2022-01-01 PROCEDURE — 93970 EXTREMITY STUDY: CPT

## 2022-01-01 PROCEDURE — 85027 COMPLETE CBC AUTOMATED: CPT | Performed by: INTERNAL MEDICINE

## 2022-01-01 PROCEDURE — 99285 EMERGENCY DEPT VISIT HI MDM: CPT | Mod: 25

## 2022-01-01 PROCEDURE — 99223 1ST HOSP IP/OBS HIGH 75: CPT | Performed by: NURSE PRACTITIONER

## 2022-01-01 PROCEDURE — 93306 TTE W/DOPPLER COMPLETE: CPT | Mod: 26 | Performed by: INTERNAL MEDICINE

## 2022-01-01 PROCEDURE — 84484 ASSAY OF TROPONIN QUANT: CPT | Performed by: EMERGENCY MEDICINE

## 2022-01-01 PROCEDURE — 97161 PT EVAL LOW COMPLEX 20 MIN: CPT | Mod: GP

## 2022-01-01 PROCEDURE — A9585 GADOBUTROL INJECTION: HCPCS | Performed by: EMERGENCY MEDICINE

## 2022-01-01 PROCEDURE — 96360 HYDRATION IV INFUSION INIT: CPT | Mod: 59

## 2022-01-01 PROCEDURE — 85025 COMPLETE CBC W/AUTO DIFF WBC: CPT | Performed by: EMERGENCY MEDICINE

## 2022-01-01 PROCEDURE — 80053 COMPREHEN METABOLIC PANEL: CPT | Performed by: EMERGENCY MEDICINE

## 2022-01-01 PROCEDURE — 999N000128 HC STATISTIC PERIPHERAL IV START W/O US GUIDANCE

## 2022-01-01 PROCEDURE — 80053 COMPREHEN METABOLIC PANEL: CPT | Performed by: INTERNAL MEDICINE

## 2022-01-01 PROCEDURE — 250N000011 HC RX IP 250 OP 636: Performed by: INTERNAL MEDICINE

## 2022-01-01 PROCEDURE — 36415 COLL VENOUS BLD VENIPUNCTURE: CPT | Mod: ORL | Performed by: NURSE PRACTITIONER

## 2022-01-01 PROCEDURE — 258N000003 HC RX IP 258 OP 636: Performed by: HOSPITALIST

## 2022-01-01 PROCEDURE — 70450 CT HEAD/BRAIN W/O DYE: CPT | Mod: MA

## 2022-01-01 PROCEDURE — 85730 THROMBOPLASTIN TIME PARTIAL: CPT | Performed by: EMERGENCY MEDICINE

## 2022-01-01 PROCEDURE — 250N000012 HC RX MED GY IP 250 OP 636 PS 637: Performed by: HOSPITALIST

## 2022-01-01 PROCEDURE — 255N000002 HC RX 255 OP 636: Performed by: EMERGENCY MEDICINE

## 2022-01-01 PROCEDURE — 250N000011 HC RX IP 250 OP 636

## 2022-01-01 PROCEDURE — 85049 AUTOMATED PLATELET COUNT: CPT | Performed by: HOSPITALIST

## 2022-01-01 PROCEDURE — 93306 TTE W/DOPPLER COMPLETE: CPT

## 2022-01-01 PROCEDURE — 85520 HEPARIN ASSAY: CPT | Performed by: HOSPITALIST

## 2022-01-01 PROCEDURE — P9604 ONE-WAY ALLOW PRORATED TRIP: HCPCS | Performed by: NURSE PRACTITIONER

## 2022-01-01 PROCEDURE — 70553 MRI BRAIN STEM W/O & W/DYE: CPT | Mod: MA

## 2022-01-01 PROCEDURE — 250N000012 HC RX MED GY IP 250 OP 636 PS 637: Performed by: INTERNAL MEDICINE

## 2022-01-01 PROCEDURE — 82803 BLOOD GASES ANY COMBINATION: CPT | Performed by: INTERNAL MEDICINE

## 2022-01-01 PROCEDURE — 82310 ASSAY OF CALCIUM: CPT | Performed by: INTERNAL MEDICINE

## 2022-01-01 PROCEDURE — 71275 CT ANGIOGRAPHY CHEST: CPT | Mod: MA

## 2022-01-01 PROCEDURE — 80048 BASIC METABOLIC PNL TOTAL CA: CPT | Performed by: HOSPITALIST

## 2022-01-01 PROCEDURE — 36415 COLL VENOUS BLD VENIPUNCTURE: CPT | Performed by: EMERGENCY MEDICINE

## 2022-01-01 RX ORDER — HYDROXYZINE HYDROCHLORIDE 10 MG/1
10 TABLET, FILM COATED ORAL 3 TIMES DAILY PRN
COMMUNITY

## 2022-01-01 RX ORDER — CARBIDOPA AND LEVODOPA 25; 100 MG/1; MG/1
1 TABLET, EXTENDED RELEASE ORAL AT BEDTIME
COMMUNITY

## 2022-01-01 RX ORDER — DEXTROSE MONOHYDRATE 25 G/50ML
25-50 INJECTION, SOLUTION INTRAVENOUS
Status: DISCONTINUED | OUTPATIENT
Start: 2022-01-01 | End: 2022-01-01 | Stop reason: HOSPADM

## 2022-01-01 RX ORDER — ISOSORBIDE MONONITRATE 30 MG/1
30 TABLET, EXTENDED RELEASE ORAL DAILY
COMMUNITY

## 2022-01-01 RX ORDER — DILTIAZEM HYDROCHLORIDE 180 MG/1
180 CAPSULE, COATED, EXTENDED RELEASE ORAL DAILY
Status: DISCONTINUED | OUTPATIENT
Start: 2022-01-01 | End: 2022-01-01 | Stop reason: HOSPADM

## 2022-01-01 RX ORDER — ACETAMINOPHEN 325 MG/10.15ML
650 LIQUID ORAL EVERY 4 HOURS PRN
Status: DISCONTINUED | OUTPATIENT
Start: 2022-01-01 | End: 2022-01-01 | Stop reason: HOSPADM

## 2022-01-01 RX ORDER — HEPARIN SODIUM 10000 [USP'U]/100ML
0-5000 INJECTION, SOLUTION INTRAVENOUS CONTINUOUS
Status: DISCONTINUED | OUTPATIENT
Start: 2022-01-01 | End: 2022-01-01

## 2022-01-01 RX ORDER — ONDANSETRON 8 MG/1
8 TABLET, ORALLY DISINTEGRATING ORAL EVERY 8 HOURS PRN
Qty: 12 TABLET | Refills: 0 | Status: SHIPPED | OUTPATIENT
Start: 2022-01-01

## 2022-01-01 RX ORDER — POTASSIUM CHLORIDE 20MEQ/15ML
20 LIQUID (ML) ORAL ONCE
Status: COMPLETED | OUTPATIENT
Start: 2022-01-01 | End: 2022-01-01

## 2022-01-01 RX ORDER — LORAZEPAM 0.5 MG/1
0.5 TABLET ORAL EVERY 4 HOURS PRN
Qty: 18 TABLET | Refills: 0 | Status: SHIPPED | OUTPATIENT
Start: 2022-01-01

## 2022-01-01 RX ORDER — CARBIDOPA AND LEVODOPA 25; 100 MG/1; MG/1
2 TABLET ORAL 4 TIMES DAILY
Status: ON HOLD | COMMUNITY
End: 2022-01-01

## 2022-01-01 RX ORDER — MORPHINE SULFATE 30 MG/1
5 TABLET ORAL
Qty: 30 TABLET | Refills: 0 | Status: SHIPPED | OUTPATIENT
Start: 2022-01-01 | End: 2022-01-01

## 2022-01-01 RX ORDER — AMOXICILLIN 250 MG
1 CAPSULE ORAL 2 TIMES DAILY PRN
Status: DISCONTINUED | OUTPATIENT
Start: 2022-01-01 | End: 2022-01-01 | Stop reason: HOSPADM

## 2022-01-01 RX ORDER — PROCHLORPERAZINE MALEATE 5 MG
5 TABLET ORAL EVERY 6 HOURS PRN
Status: DISCONTINUED | OUTPATIENT
Start: 2022-01-01 | End: 2022-01-01

## 2022-01-01 RX ORDER — SPIRONOLACTONE 25 MG/1
25 TABLET ORAL DAILY
Status: DISCONTINUED | OUTPATIENT
Start: 2022-01-01 | End: 2022-01-01 | Stop reason: HOSPADM

## 2022-01-01 RX ORDER — SODIUM CHLORIDE AND POTASSIUM CHLORIDE 150; 450 MG/100ML; MG/100ML
INJECTION, SOLUTION INTRAVENOUS CONTINUOUS
Status: DISCONTINUED | OUTPATIENT
Start: 2022-01-01 | End: 2022-01-01

## 2022-01-01 RX ORDER — FLUTICASONE PROPIONATE 50 MCG
2 SPRAY, SUSPENSION (ML) NASAL DAILY
Status: DISCONTINUED | OUTPATIENT
Start: 2022-01-01 | End: 2022-01-01 | Stop reason: HOSPADM

## 2022-01-01 RX ORDER — NITROGLYCERIN 0.4 MG/1
0.4 TABLET SUBLINGUAL EVERY 5 MIN PRN
Status: DISCONTINUED | OUTPATIENT
Start: 2022-01-01 | End: 2022-01-01

## 2022-01-01 RX ORDER — ISOSORBIDE MONONITRATE 30 MG/1
30 TABLET, EXTENDED RELEASE ORAL DAILY
Status: DISCONTINUED | OUTPATIENT
Start: 2022-01-01 | End: 2022-01-01 | Stop reason: HOSPADM

## 2022-01-01 RX ORDER — LEVOTHYROXINE SODIUM 88 UG/1
88 TABLET ORAL
Status: DISCONTINUED | OUTPATIENT
Start: 2022-01-01 | End: 2022-01-01 | Stop reason: HOSPADM

## 2022-01-01 RX ORDER — POTASSIUM CHLORIDE 20MEQ/15ML
40 LIQUID (ML) ORAL ONCE
Status: COMPLETED | OUTPATIENT
Start: 2022-01-01 | End: 2022-01-01

## 2022-01-01 RX ORDER — SODIUM CHLORIDE 9 MG/ML
INJECTION, SOLUTION INTRAVENOUS CONTINUOUS
Status: DISCONTINUED | OUTPATIENT
Start: 2022-01-01 | End: 2022-01-01

## 2022-01-01 RX ORDER — GADOBUTROL 604.72 MG/ML
8 INJECTION INTRAVENOUS ONCE
Status: COMPLETED | OUTPATIENT
Start: 2022-01-01 | End: 2022-01-01

## 2022-01-01 RX ORDER — DONEPEZIL HYDROCHLORIDE 10 MG/1
10 TABLET, FILM COATED ORAL AT BEDTIME
Status: DISCONTINUED | OUTPATIENT
Start: 2022-01-01 | End: 2022-01-01 | Stop reason: HOSPADM

## 2022-01-01 RX ORDER — CARBIDOPA AND LEVODOPA 25; 100 MG/1; MG/1
1 TABLET, EXTENDED RELEASE ORAL AT BEDTIME
Status: DISCONTINUED | OUTPATIENT
Start: 2022-01-01 | End: 2022-01-01 | Stop reason: HOSPADM

## 2022-01-01 RX ORDER — ONDANSETRON 4 MG/1
TABLET, FILM COATED ORAL EVERY 8 HOURS PRN
Status: ON HOLD | COMMUNITY
End: 2022-01-01

## 2022-01-01 RX ORDER — PANTOPRAZOLE SODIUM 20 MG/1
40 TABLET, DELAYED RELEASE ORAL 2 TIMES DAILY WITH MEALS
COMMUNITY

## 2022-01-01 RX ORDER — CITALOPRAM HYDROBROMIDE 20 MG/1
40 TABLET ORAL DAILY
Status: DISCONTINUED | OUTPATIENT
Start: 2022-01-01 | End: 2022-01-01 | Stop reason: HOSPADM

## 2022-01-01 RX ORDER — BUMETANIDE 1 MG/1
2 TABLET ORAL DAILY
Status: ON HOLD | COMMUNITY
End: 2022-01-01

## 2022-01-01 RX ORDER — LORAZEPAM 0.5 MG/1
0.5 TABLET ORAL EVERY 4 HOURS PRN
Qty: 18 TABLET | Refills: 0 | Status: SHIPPED | OUTPATIENT
Start: 2022-01-01 | End: 2022-01-01

## 2022-01-01 RX ORDER — ENOXAPARIN SODIUM 100 MG/ML
70 INJECTION SUBCUTANEOUS EVERY 12 HOURS
Status: DISCONTINUED | OUTPATIENT
Start: 2022-01-01 | End: 2022-01-01

## 2022-01-01 RX ORDER — DONEPEZIL HYDROCHLORIDE 10 MG/1
10 TABLET, FILM COATED ORAL AT BEDTIME
COMMUNITY

## 2022-01-01 RX ORDER — LIDOCAINE 40 MG/G
CREAM TOPICAL
Status: DISCONTINUED | OUTPATIENT
Start: 2022-01-01 | End: 2022-01-01

## 2022-01-01 RX ORDER — TRAZODONE HYDROCHLORIDE 50 MG/1
50-100 TABLET, FILM COATED ORAL AT BEDTIME
Status: DISCONTINUED | OUTPATIENT
Start: 2022-01-01 | End: 2022-01-01 | Stop reason: HOSPADM

## 2022-01-01 RX ORDER — RIVASTIGMINE 4.6 MG/24H
1 PATCH, EXTENDED RELEASE TRANSDERMAL DAILY
Status: ON HOLD | COMMUNITY
End: 2022-01-01

## 2022-01-01 RX ORDER — ALBUTEROL SULFATE 90 UG/1
2 AEROSOL, METERED RESPIRATORY (INHALATION) EVERY 6 HOURS PRN
COMMUNITY

## 2022-01-01 RX ORDER — AMOXICILLIN 250 MG
2 CAPSULE ORAL 2 TIMES DAILY PRN
Status: DISCONTINUED | OUTPATIENT
Start: 2022-01-01 | End: 2022-01-01 | Stop reason: HOSPADM

## 2022-01-01 RX ORDER — PANTOPRAZOLE SODIUM 40 MG/1
40 TABLET, DELAYED RELEASE ORAL
Status: DISCONTINUED | OUTPATIENT
Start: 2022-01-01 | End: 2022-01-01 | Stop reason: HOSPADM

## 2022-01-01 RX ORDER — NAPROXEN 500 MG/1
500 TABLET ORAL 2 TIMES DAILY PRN
Status: ON HOLD | COMMUNITY
End: 2022-01-01

## 2022-01-01 RX ORDER — ONDANSETRON 4 MG/1
4 TABLET, ORALLY DISINTEGRATING ORAL EVERY 6 HOURS PRN
Status: DISCONTINUED | OUTPATIENT
Start: 2022-01-01 | End: 2022-01-01 | Stop reason: HOSPADM

## 2022-01-01 RX ORDER — IOPAMIDOL 755 MG/ML
69 INJECTION, SOLUTION INTRAVASCULAR ONCE
Status: COMPLETED | OUTPATIENT
Start: 2022-01-01 | End: 2022-01-01

## 2022-01-01 RX ORDER — BUPROPION HYDROCHLORIDE 150 MG/1
300 TABLET ORAL EVERY MORNING
Status: DISCONTINUED | OUTPATIENT
Start: 2022-01-01 | End: 2022-01-01 | Stop reason: HOSPADM

## 2022-01-01 RX ORDER — CARBIDOPA AND LEVODOPA 25; 100 MG/1; MG/1
1 TABLET ORAL ONCE
Status: COMPLETED | OUTPATIENT
Start: 2022-01-01 | End: 2022-01-01

## 2022-01-01 RX ORDER — FLUTICASONE FUROATE AND VILANTEROL 200; 25 UG/1; UG/1
1 POWDER RESPIRATORY (INHALATION) DAILY
Status: DISCONTINUED | OUTPATIENT
Start: 2022-01-01 | End: 2022-01-01 | Stop reason: HOSPADM

## 2022-01-01 RX ORDER — ONDANSETRON 2 MG/ML
4 INJECTION INTRAMUSCULAR; INTRAVENOUS EVERY 6 HOURS PRN
Status: DISCONTINUED | OUTPATIENT
Start: 2022-01-01 | End: 2022-01-01 | Stop reason: HOSPADM

## 2022-01-01 RX ORDER — HYDROMORPHONE HYDROCHLORIDE 2 MG/1
1 TABLET ORAL EVERY 6 HOURS PRN
Qty: 20 TABLET | Refills: 0 | Status: SHIPPED | OUTPATIENT
Start: 2022-01-01

## 2022-01-01 RX ORDER — ALBUTEROL SULFATE 90 UG/1
2 AEROSOL, METERED RESPIRATORY (INHALATION) EVERY 6 HOURS PRN
Status: DISCONTINUED | OUTPATIENT
Start: 2022-01-01 | End: 2022-01-01 | Stop reason: HOSPADM

## 2022-01-01 RX ORDER — PREGABALIN 25 MG/1
25 CAPSULE ORAL 3 TIMES DAILY
Status: DISCONTINUED | OUTPATIENT
Start: 2022-01-01 | End: 2022-01-01 | Stop reason: HOSPADM

## 2022-01-01 RX ORDER — TOPIRAMATE 50 MG/1
50 TABLET, FILM COATED ORAL 3 TIMES DAILY
Status: DISCONTINUED | OUTPATIENT
Start: 2022-01-01 | End: 2022-01-01 | Stop reason: HOSPADM

## 2022-01-01 RX ORDER — ONDANSETRON 8 MG/1
8 TABLET, ORALLY DISINTEGRATING ORAL EVERY 8 HOURS PRN
Qty: 12 TABLET | Refills: 0 | Status: SHIPPED | OUTPATIENT
Start: 2022-01-01 | End: 2022-01-01

## 2022-01-01 RX ORDER — NICOTINE POLACRILEX 4 MG
15-30 LOZENGE BUCCAL
Status: DISCONTINUED | OUTPATIENT
Start: 2022-01-01 | End: 2022-01-01 | Stop reason: HOSPADM

## 2022-01-01 RX ORDER — CARBIDOPA AND LEVODOPA 25; 100 MG/1; MG/1
2 TABLET ORAL 4 TIMES DAILY
Status: DISCONTINUED | OUTPATIENT
Start: 2022-01-01 | End: 2022-01-01 | Stop reason: HOSPADM

## 2022-01-01 RX ORDER — CARBIDOPA AND LEVODOPA 25; 100 MG/1; MG/1
2 TABLET ORAL 4 TIMES DAILY
DISCHARGE
Start: 2022-01-01

## 2022-01-01 RX ORDER — LIDOCAINE 40 MG/G
CREAM TOPICAL
Status: DISCONTINUED | OUTPATIENT
Start: 2022-01-01 | End: 2022-01-01 | Stop reason: HOSPADM

## 2022-01-01 RX ORDER — PROCHLORPERAZINE 25 MG
12.5 SUPPOSITORY, RECTAL RECTAL EVERY 12 HOURS PRN
Status: DISCONTINUED | OUTPATIENT
Start: 2022-01-01 | End: 2022-01-01

## 2022-01-01 RX ORDER — ROSUVASTATIN CALCIUM 5 MG/1
5 TABLET, COATED ORAL DAILY
Status: DISCONTINUED | OUTPATIENT
Start: 2022-01-01 | End: 2022-01-01

## 2022-01-01 RX ADMIN — TOPIRAMATE 50 MG: 50 TABLET ORAL at 22:48

## 2022-01-01 RX ADMIN — CITALOPRAM HYDROBROMIDE 40 MG: 20 TABLET ORAL at 18:13

## 2022-01-01 RX ADMIN — SODIUM CHLORIDE: 9 INJECTION, SOLUTION INTRAVENOUS at 15:25

## 2022-01-01 RX ADMIN — APIXABAN 5 MG: 5 TABLET, FILM COATED ORAL at 22:33

## 2022-01-01 RX ADMIN — PANTOPRAZOLE SODIUM 40 MG: 40 INJECTION, POWDER, FOR SOLUTION INTRAVENOUS at 23:53

## 2022-01-01 RX ADMIN — PROCHLORPERAZINE EDISYLATE 5 MG: 5 INJECTION INTRAMUSCULAR; INTRAVENOUS at 04:48

## 2022-01-01 RX ADMIN — ENOXAPARIN SODIUM 70 MG: 80 INJECTION SUBCUTANEOUS at 12:15

## 2022-01-01 RX ADMIN — TOPIRAMATE 50 MG: 50 TABLET ORAL at 08:34

## 2022-01-01 RX ADMIN — ENOXAPARIN SODIUM 70 MG: 80 INJECTION SUBCUTANEOUS at 09:54

## 2022-01-01 RX ADMIN — TOPIRAMATE 50 MG: 50 TABLET ORAL at 15:50

## 2022-01-01 RX ADMIN — TOPIRAMATE 50 MG: 50 TABLET ORAL at 09:18

## 2022-01-01 RX ADMIN — PREGABALIN 25 MG: 25 CAPSULE ORAL at 22:48

## 2022-01-01 RX ADMIN — INSULIN ASPART 1 UNITS: 100 INJECTION, SOLUTION INTRAVENOUS; SUBCUTANEOUS at 22:17

## 2022-01-01 RX ADMIN — ACETAMINOPHEN 650 MG: 325 SUSPENSION ORAL at 23:53

## 2022-01-01 RX ADMIN — LEVOTHYROXINE SODIUM 88 MCG: 88 TABLET ORAL at 08:37

## 2022-01-01 RX ADMIN — SPIRONOLACTONE 25 MG: 25 TABLET ORAL at 10:02

## 2022-01-01 RX ADMIN — ISOSORBIDE MONONITRATE 30 MG: 30 TABLET, EXTENDED RELEASE ORAL at 10:09

## 2022-01-01 RX ADMIN — ISOSORBIDE MONONITRATE 30 MG: 30 TABLET, EXTENDED RELEASE ORAL at 10:02

## 2022-01-01 RX ADMIN — CARBIDOPA AND LEVODOPA 2 TABLET: 25; 100 TABLET ORAL at 21:38

## 2022-01-01 RX ADMIN — TOPIRAMATE 50 MG: 50 TABLET ORAL at 00:05

## 2022-01-01 RX ADMIN — CARBIDOPA AND LEVODOPA 2 TABLET: 25; 100 TABLET ORAL at 14:09

## 2022-01-01 RX ADMIN — DILTIAZEM HYDROCHLORIDE 180 MG: 180 CAPSULE, COATED, EXTENDED RELEASE ORAL at 08:36

## 2022-01-01 RX ADMIN — PANTOPRAZOLE SODIUM 40 MG: 40 INJECTION, POWDER, FOR SOLUTION INTRAVENOUS at 10:03

## 2022-01-01 RX ADMIN — FLUTICASONE FUROATE AND VILANTEROL TRIFENATATE 1 PUFF: 200; 25 POWDER RESPIRATORY (INHALATION) at 10:32

## 2022-01-01 RX ADMIN — BUPROPION HYDROCHLORIDE 300 MG: 150 TABLET, FILM COATED, EXTENDED RELEASE ORAL at 10:09

## 2022-01-01 RX ADMIN — PANTOPRAZOLE SODIUM 40 MG: 40 INJECTION, POWDER, FOR SOLUTION INTRAVENOUS at 20:58

## 2022-01-01 RX ADMIN — FLUTICASONE FUROATE AND VILANTEROL TRIFENATATE 1 PUFF: 200; 25 POWDER RESPIRATORY (INHALATION) at 08:42

## 2022-01-01 RX ADMIN — FLUTICASONE FUROATE AND VILANTEROL TRIFENATATE 1 PUFF: 200; 25 POWDER RESPIRATORY (INHALATION) at 08:46

## 2022-01-01 RX ADMIN — CARBIDOPA AND LEVODOPA 2 TABLET: 25; 100 TABLET ORAL at 13:16

## 2022-01-01 RX ADMIN — SPIRONOLACTONE 25 MG: 25 TABLET ORAL at 10:09

## 2022-01-01 RX ADMIN — TOPIRAMATE 50 MG: 50 TABLET ORAL at 23:41

## 2022-01-01 RX ADMIN — CARBIDOPA AND LEVODOPA 2 TABLET: 25; 100 TABLET ORAL at 08:37

## 2022-01-01 RX ADMIN — CARBIDOPA AND LEVODOPA 2 TABLET: 25; 100 TABLET ORAL at 10:02

## 2022-01-01 RX ADMIN — DONEPEZIL HYDROCHLORIDE 10 MG: 10 TABLET ORAL at 22:48

## 2022-01-01 RX ADMIN — PANTOPRAZOLE SODIUM 40 MG: 40 INJECTION, POWDER, FOR SOLUTION INTRAVENOUS at 08:37

## 2022-01-01 RX ADMIN — LEVOTHYROXINE SODIUM 88 MCG: 88 TABLET ORAL at 10:09

## 2022-01-01 RX ADMIN — LEVOTHYROXINE SODIUM 88 MCG: 88 TABLET ORAL at 08:12

## 2022-01-01 RX ADMIN — HEPARIN SODIUM 1500 UNITS/HR: 10000 INJECTION, SOLUTION INTRAVENOUS at 18:53

## 2022-01-01 RX ADMIN — TOPIRAMATE 50 MG: 50 TABLET ORAL at 10:16

## 2022-01-01 RX ADMIN — CARBIDOPA AND LEVODOPA 1 TABLET: 25; 100 TABLET ORAL at 03:23

## 2022-01-01 RX ADMIN — PANTOPRAZOLE SODIUM 40 MG: 40 TABLET, DELAYED RELEASE ORAL at 10:09

## 2022-01-01 RX ADMIN — DILTIAZEM HYDROCHLORIDE 180 MG: 180 CAPSULE, COATED, EXTENDED RELEASE ORAL at 10:09

## 2022-01-01 RX ADMIN — CARBIDOPA AND LEVODOPA 2 TABLET: 25; 100 TABLET ORAL at 21:11

## 2022-01-01 RX ADMIN — INSULIN GLARGINE 10 UNITS: 100 INJECTION, SOLUTION SUBCUTANEOUS at 12:17

## 2022-01-01 RX ADMIN — ISOSORBIDE MONONITRATE 30 MG: 30 TABLET, EXTENDED RELEASE ORAL at 09:18

## 2022-01-01 RX ADMIN — INSULIN GLARGINE 10 UNITS: 100 INJECTION, SOLUTION SUBCUTANEOUS at 08:37

## 2022-01-01 RX ADMIN — PANTOPRAZOLE SODIUM 40 MG: 40 TABLET, DELAYED RELEASE ORAL at 17:00

## 2022-01-01 RX ADMIN — ONDANSETRON 4 MG: 2 INJECTION INTRAMUSCULAR; INTRAVENOUS at 04:36

## 2022-01-01 RX ADMIN — POTASSIUM CHLORIDE 40 MEQ: 20 SOLUTION ORAL at 14:52

## 2022-01-01 RX ADMIN — SPIRONOLACTONE 25 MG: 25 TABLET ORAL at 09:18

## 2022-01-01 RX ADMIN — INSULIN ASPART 1 UNITS: 100 INJECTION, SOLUTION INTRAVENOUS; SUBCUTANEOUS at 21:39

## 2022-01-01 RX ADMIN — GADOBUTROL 8 ML: 604.72 INJECTION INTRAVENOUS at 17:37

## 2022-01-01 RX ADMIN — POTASSIUM CHLORIDE 40 MEQ: 20 SOLUTION ORAL at 00:24

## 2022-01-01 RX ADMIN — PANTOPRAZOLE SODIUM 40 MG: 40 INJECTION, POWDER, FOR SOLUTION INTRAVENOUS at 23:22

## 2022-01-01 RX ADMIN — CARBIDOPA AND LEVODOPA 1 TABLET: 25; 100 TABLET, EXTENDED RELEASE ORAL at 00:05

## 2022-01-01 RX ADMIN — CARBIDOPA AND LEVODOPA 2 TABLET: 25; 100 TABLET ORAL at 18:16

## 2022-01-01 RX ADMIN — PANTOPRAZOLE SODIUM 40 MG: 40 TABLET, DELAYED RELEASE ORAL at 07:15

## 2022-01-01 RX ADMIN — HEPARIN SODIUM 1200 UNITS/HR: 10000 INJECTION, SOLUTION INTRAVENOUS at 10:46

## 2022-01-01 RX ADMIN — CARBIDOPA AND LEVODOPA 2 TABLET: 25; 100 TABLET ORAL at 10:09

## 2022-01-01 RX ADMIN — SODIUM CHLORIDE: 9 INJECTION, SOLUTION INTRAVENOUS at 00:01

## 2022-01-01 RX ADMIN — SODIUM CHLORIDE 93 ML: 9 INJECTION, SOLUTION INTRAVENOUS at 18:16

## 2022-01-01 RX ADMIN — IOPAMIDOL 69 ML: 755 INJECTION, SOLUTION INTRAVENOUS at 18:16

## 2022-01-01 RX ADMIN — LEVOTHYROXINE SODIUM 88 MCG: 88 TABLET ORAL at 07:15

## 2022-01-01 RX ADMIN — CARBIDOPA AND LEVODOPA 2 TABLET: 25; 100 TABLET ORAL at 12:15

## 2022-01-01 RX ADMIN — BUPROPION HYDROCHLORIDE 300 MG: 150 TABLET, FILM COATED, EXTENDED RELEASE ORAL at 08:37

## 2022-01-01 RX ADMIN — TOPIRAMATE 50 MG: 50 TABLET ORAL at 21:38

## 2022-01-01 RX ADMIN — CARBIDOPA AND LEVODOPA 2 TABLET: 25; 100 TABLET ORAL at 12:39

## 2022-01-01 RX ADMIN — CARBIDOPA AND LEVODOPA 2 TABLET: 25; 100 TABLET ORAL at 08:12

## 2022-01-01 RX ADMIN — INSULIN ASPART 1 UNITS: 100 INJECTION, SOLUTION INTRAVENOUS; SUBCUTANEOUS at 22:48

## 2022-01-01 RX ADMIN — TOPIRAMATE 50 MG: 50 TABLET ORAL at 08:37

## 2022-01-01 RX ADMIN — HEPARIN SODIUM 600 UNITS/HR: 10000 INJECTION, SOLUTION INTRAVENOUS at 01:59

## 2022-01-01 RX ADMIN — TOPIRAMATE 50 MG: 50 TABLET ORAL at 16:17

## 2022-01-01 RX ADMIN — ENOXAPARIN SODIUM 70 MG: 80 INJECTION SUBCUTANEOUS at 10:44

## 2022-01-01 RX ADMIN — TOPIRAMATE 50 MG: 50 TABLET ORAL at 21:14

## 2022-01-01 RX ADMIN — DILTIAZEM HYDROCHLORIDE 180 MG: 180 CAPSULE, COATED, EXTENDED RELEASE ORAL at 10:16

## 2022-01-01 RX ADMIN — ISOSORBIDE MONONITRATE 30 MG: 30 TABLET, EXTENDED RELEASE ORAL at 08:32

## 2022-01-01 RX ADMIN — CARBIDOPA AND LEVODOPA 2 TABLET: 25; 100 TABLET ORAL at 10:16

## 2022-01-01 RX ADMIN — TRAZODONE HYDROCHLORIDE 50 MG: 50 TABLET ORAL at 22:48

## 2022-01-01 RX ADMIN — APIXABAN 5 MG: 5 TABLET, FILM COATED ORAL at 10:09

## 2022-01-01 RX ADMIN — PANTOPRAZOLE SODIUM 40 MG: 40 TABLET, DELAYED RELEASE ORAL at 16:17

## 2022-01-01 RX ADMIN — ENOXAPARIN SODIUM 70 MG: 80 INJECTION SUBCUTANEOUS at 23:51

## 2022-01-01 RX ADMIN — DONEPEZIL HYDROCHLORIDE 10 MG: 10 TABLET ORAL at 22:36

## 2022-01-01 RX ADMIN — TOPIRAMATE 50 MG: 50 TABLET ORAL at 17:00

## 2022-01-01 RX ADMIN — TOPIRAMATE 50 MG: 50 TABLET ORAL at 10:09

## 2022-01-01 RX ADMIN — ROSUVASTATIN CALCIUM 5 MG: 5 TABLET, FILM COATED ORAL at 08:45

## 2022-01-01 RX ADMIN — PANTOPRAZOLE SODIUM 40 MG: 40 INJECTION, POWDER, FOR SOLUTION INTRAVENOUS at 08:12

## 2022-01-01 RX ADMIN — ARGATROBAN 1 MCG/KG/MIN: 50 INJECTION, SOLUTION INTRAVENOUS at 21:52

## 2022-01-01 RX ADMIN — FLUTICASONE FUROATE AND VILANTEROL TRIFENATATE 1 PUFF: 200; 25 POWDER RESPIRATORY (INHALATION) at 09:18

## 2022-01-01 RX ADMIN — CARBIDOPA AND LEVODOPA 1 TABLET: 25; 100 TABLET, EXTENDED RELEASE ORAL at 22:33

## 2022-01-01 RX ADMIN — ARGATROBAN 1 MCG/KG/MIN: 50 INJECTION, SOLUTION INTRAVENOUS at 10:24

## 2022-01-01 RX ADMIN — TRAZODONE HYDROCHLORIDE 50 MG: 50 TABLET ORAL at 21:14

## 2022-01-01 RX ADMIN — ENOXAPARIN SODIUM 70 MG: 80 INJECTION SUBCUTANEOUS at 22:49

## 2022-01-01 RX ADMIN — SPIRONOLACTONE 25 MG: 25 TABLET ORAL at 08:36

## 2022-01-01 RX ADMIN — DILTIAZEM HYDROCHLORIDE 180 MG: 180 CAPSULE, COATED, EXTENDED RELEASE ORAL at 09:18

## 2022-01-01 RX ADMIN — DONEPEZIL HYDROCHLORIDE 10 MG: 10 TABLET ORAL at 21:38

## 2022-01-01 RX ADMIN — SODIUM CHLORIDE 1000 ML: 9 INJECTION, SOLUTION INTRAVENOUS at 13:19

## 2022-01-01 RX ADMIN — DONEPEZIL HYDROCHLORIDE 10 MG: 10 TABLET ORAL at 23:42

## 2022-01-01 RX ADMIN — PANTOPRAZOLE SODIUM 40 MG: 40 TABLET, DELAYED RELEASE ORAL at 15:50

## 2022-01-01 RX ADMIN — FLUTICASONE PROPIONATE 2 SPRAY: 50 SPRAY, METERED NASAL at 09:18

## 2022-01-01 RX ADMIN — DONEPEZIL HYDROCHLORIDE 10 MG: 10 TABLET ORAL at 21:14

## 2022-01-01 RX ADMIN — CARBIDOPA AND LEVODOPA 2 TABLET: 25; 100 TABLET ORAL at 14:12

## 2022-01-01 RX ADMIN — ARGATROBAN 1 MCG/KG/MIN: 50 INJECTION, SOLUTION INTRAVENOUS at 21:29

## 2022-01-01 RX ADMIN — CARBIDOPA AND LEVODOPA 2 TABLET: 25; 100 TABLET ORAL at 22:48

## 2022-01-01 RX ADMIN — SPIRONOLACTONE 25 MG: 25 TABLET ORAL at 08:37

## 2022-01-01 RX ADMIN — CARBIDOPA AND LEVODOPA 1 TABLET: 25; 100 TABLET, EXTENDED RELEASE ORAL at 22:48

## 2022-01-01 RX ADMIN — ROSUVASTATIN CALCIUM 5 MG: 5 TABLET, FILM COATED ORAL at 10:02

## 2022-01-01 RX ADMIN — SPIRONOLACTONE 25 MG: 25 TABLET ORAL at 10:16

## 2022-01-01 RX ADMIN — INSULIN ASPART 1 UNITS: 100 INJECTION, SOLUTION INTRAVENOUS; SUBCUTANEOUS at 21:01

## 2022-01-01 RX ADMIN — CARBIDOPA AND LEVODOPA 2 TABLET: 25; 100 TABLET ORAL at 13:47

## 2022-01-01 RX ADMIN — CARBIDOPA AND LEVODOPA 1 TABLET: 25; 100 TABLET, EXTENDED RELEASE ORAL at 21:38

## 2022-01-01 RX ADMIN — ISOSORBIDE MONONITRATE 30 MG: 30 TABLET, EXTENDED RELEASE ORAL at 08:37

## 2022-01-01 RX ADMIN — CARBIDOPA AND LEVODOPA 2 TABLET: 25; 100 TABLET ORAL at 17:33

## 2022-01-01 RX ADMIN — CARBIDOPA AND LEVODOPA 1 TABLET: 25; 100 TABLET, EXTENDED RELEASE ORAL at 21:11

## 2022-01-01 RX ADMIN — BUPROPION HYDROCHLORIDE 300 MG: 150 TABLET, FILM COATED, EXTENDED RELEASE ORAL at 10:16

## 2022-01-01 RX ADMIN — SODIUM CHLORIDE: 9 INJECTION, SOLUTION INTRAVENOUS at 12:38

## 2022-01-01 RX ADMIN — CARBIDOPA AND LEVODOPA 2 TABLET: 25; 100 TABLET ORAL at 18:13

## 2022-01-01 RX ADMIN — TOPIRAMATE 50 MG: 50 TABLET ORAL at 22:38

## 2022-01-01 RX ADMIN — CARBIDOPA AND LEVODOPA 2 TABLET: 25; 100 TABLET ORAL at 23:34

## 2022-01-01 RX ADMIN — CARBIDOPA AND LEVODOPA 2 TABLET: 25; 100 TABLET ORAL at 09:18

## 2022-01-01 RX ADMIN — ISOSORBIDE MONONITRATE 30 MG: 30 TABLET, EXTENDED RELEASE ORAL at 10:16

## 2022-01-01 RX ADMIN — CARBIDOPA AND LEVODOPA 2 TABLET: 25; 100 TABLET ORAL at 22:33

## 2022-01-01 RX ADMIN — ONDANSETRON 4 MG: 2 INJECTION INTRAMUSCULAR; INTRAVENOUS at 16:12

## 2022-01-01 RX ADMIN — POTASSIUM CHLORIDE AND SODIUM CHLORIDE: 450; 150 INJECTION, SOLUTION INTRAVENOUS at 17:56

## 2022-01-01 RX ADMIN — DONEPEZIL HYDROCHLORIDE 10 MG: 10 TABLET ORAL at 00:04

## 2022-01-01 RX ADMIN — INSULIN ASPART 1 UNITS: 100 INJECTION, SOLUTION INTRAVENOUS; SUBCUTANEOUS at 22:39

## 2022-01-01 RX ADMIN — CITALOPRAM HYDROBROMIDE 40 MG: 20 TABLET ORAL at 08:39

## 2022-01-01 RX ADMIN — LEVOTHYROXINE SODIUM 88 MCG: 88 TABLET ORAL at 10:16

## 2022-01-01 RX ADMIN — PREGABALIN 25 MG: 25 CAPSULE ORAL at 16:17

## 2022-01-01 RX ADMIN — CARBIDOPA AND LEVODOPA 1 TABLET: 25; 100 TABLET, EXTENDED RELEASE ORAL at 23:28

## 2022-01-01 RX ADMIN — ACETAMINOPHEN 650 MG: 325 SUSPENSION ORAL at 15:50

## 2022-01-01 RX ADMIN — APIXABAN 5 MG: 5 TABLET, FILM COATED ORAL at 09:51

## 2022-01-01 RX ADMIN — CARBIDOPA AND LEVODOPA 2 TABLET: 25; 100 TABLET ORAL at 00:05

## 2022-01-01 RX ADMIN — TOPIRAMATE 50 MG: 50 TABLET ORAL at 17:33

## 2022-01-01 ASSESSMENT — ACTIVITIES OF DAILY LIVING (ADL)
ADLS_ACUITY_SCORE: 41
ADLS_ACUITY_SCORE: 43
ADLS_ACUITY_SCORE: 47
ADLS_ACUITY_SCORE: 47
ADLS_ACUITY_SCORE: 45
ADLS_ACUITY_SCORE: 52
ADLS_ACUITY_SCORE: 45
ADLS_ACUITY_SCORE: 47
ADLS_ACUITY_SCORE: 41
ADLS_ACUITY_SCORE: 53
ADLS_ACUITY_SCORE: 48
ADLS_ACUITY_SCORE: 51
ADLS_ACUITY_SCORE: 47
ADLS_ACUITY_SCORE: 47
ADLS_ACUITY_SCORE: 41
ADLS_ACUITY_SCORE: 41
ADLS_ACUITY_SCORE: 33
ADLS_ACUITY_SCORE: 41
ADLS_ACUITY_SCORE: 53
ADLS_ACUITY_SCORE: 45
ADLS_ACUITY_SCORE: 52
ADLS_ACUITY_SCORE: 47
ADLS_ACUITY_SCORE: 41
ADLS_ACUITY_SCORE: 41
ADLS_ACUITY_SCORE: 47
ADLS_ACUITY_SCORE: 43
ADLS_ACUITY_SCORE: 53
ADLS_ACUITY_SCORE: 46
ADLS_ACUITY_SCORE: 52
ADLS_ACUITY_SCORE: 43
ADLS_ACUITY_SCORE: 49
ADLS_ACUITY_SCORE: 41
ADLS_ACUITY_SCORE: 41
ADLS_ACUITY_SCORE: 53
ADLS_ACUITY_SCORE: 50
ADLS_ACUITY_SCORE: 52
ADLS_ACUITY_SCORE: 48
ADLS_ACUITY_SCORE: 45
ADLS_ACUITY_SCORE: 46
ADLS_ACUITY_SCORE: 52
ADLS_ACUITY_SCORE: 53
ADLS_ACUITY_SCORE: 48
ADLS_ACUITY_SCORE: 46
ADLS_ACUITY_SCORE: 41
ADLS_ACUITY_SCORE: 52
ADLS_ACUITY_SCORE: 41
ADLS_ACUITY_SCORE: 41
ADLS_ACUITY_SCORE: 47
ADLS_ACUITY_SCORE: 43
ADLS_ACUITY_SCORE: 43
ADLS_ACUITY_SCORE: 52
ADLS_ACUITY_SCORE: 41
ADLS_ACUITY_SCORE: 47
ADLS_ACUITY_SCORE: 41
ADLS_ACUITY_SCORE: 41
ADLS_ACUITY_SCORE: 47
ADLS_ACUITY_SCORE: 43
ADLS_ACUITY_SCORE: 47
ADLS_ACUITY_SCORE: 50
ADLS_ACUITY_SCORE: 41
ADLS_ACUITY_SCORE: 47
ADLS_ACUITY_SCORE: 41
ADLS_ACUITY_SCORE: 47
ADLS_ACUITY_SCORE: 41
ADLS_ACUITY_SCORE: 41
ADLS_ACUITY_SCORE: 48
ADLS_ACUITY_SCORE: 42
ADLS_ACUITY_SCORE: 48
ADLS_ACUITY_SCORE: 41
ADLS_ACUITY_SCORE: 48
ADLS_ACUITY_SCORE: 47
ADLS_ACUITY_SCORE: 41
ADLS_ACUITY_SCORE: 47
ADLS_ACUITY_SCORE: 45
ADLS_ACUITY_SCORE: 51
ADLS_ACUITY_SCORE: 48
ADLS_ACUITY_SCORE: 41
ADLS_ACUITY_SCORE: 45
ADLS_ACUITY_SCORE: 41
ADLS_ACUITY_SCORE: 47
ADLS_ACUITY_SCORE: 41
ADLS_ACUITY_SCORE: 46
ADLS_ACUITY_SCORE: 41

## 2022-01-01 ASSESSMENT — ENCOUNTER SYMPTOMS
COUGH: 0
CHILLS: 0
ABDOMINAL PAIN: 0
DYSURIA: 0
FACIAL ASYMMETRY: 0
NUMBNESS: 0
FEVER: 0
SPEECH DIFFICULTY: 0
DIARRHEA: 0
HEADACHES: 0
NAUSEA: 0
WEAKNESS: 1
SHORTNESS OF BREATH: 0

## 2022-06-20 ENCOUNTER — LAB REQUISITION (OUTPATIENT)
Dept: LAB | Facility: CLINIC | Age: 72
End: 2022-06-20

## 2022-06-20 DIAGNOSIS — E11.9 TYPE 2 DIABETES MELLITUS WITHOUT COMPLICATIONS (H): ICD-10-CM

## 2022-06-20 LAB
ANION GAP SERPL CALCULATED.3IONS-SCNC: 14 MMOL/L (ref 5–18)
BUN SERPL-MCNC: 30 MG/DL (ref 8–28)
CALCIUM SERPL-MCNC: 10 MG/DL (ref 8.5–10.5)
CHLORIDE BLD-SCNC: 102 MMOL/L (ref 98–107)
CO2 SERPL-SCNC: 22 MMOL/L (ref 22–31)
CREAT SERPL-MCNC: 1.62 MG/DL (ref 0.6–1.1)
ERYTHROCYTE [DISTWIDTH] IN BLOOD BY AUTOMATED COUNT: 16.7 % (ref 10–15)
GFR SERPL CREATININE-BSD FRML MDRD: 33 ML/MIN/1.73M2
GLUCOSE BLD-MCNC: 162 MG/DL (ref 70–125)
HBA1C MFR BLD: 8.2 %
HCT VFR BLD AUTO: 39.7 % (ref 35–47)
HGB BLD-MCNC: 12.3 G/DL (ref 11.7–15.7)
MCH RBC QN AUTO: 26.1 PG (ref 26.5–33)
MCHC RBC AUTO-ENTMCNC: 31 G/DL (ref 31.5–36.5)
MCV RBC AUTO: 84 FL (ref 78–100)
PLATELET # BLD AUTO: 314 10E3/UL (ref 150–450)
POTASSIUM BLD-SCNC: 3.5 MMOL/L (ref 3.5–5)
RBC # BLD AUTO: 4.71 10E6/UL (ref 3.8–5.2)
SODIUM SERPL-SCNC: 138 MMOL/L (ref 136–145)
WBC # BLD AUTO: 6.8 10E3/UL (ref 4–11)

## 2022-06-20 PROCEDURE — 82310 ASSAY OF CALCIUM: CPT | Performed by: INTERNAL MEDICINE

## 2022-06-20 PROCEDURE — 85027 COMPLETE CBC AUTOMATED: CPT | Performed by: INTERNAL MEDICINE

## 2022-06-20 PROCEDURE — 83036 HEMOGLOBIN GLYCOSYLATED A1C: CPT | Performed by: INTERNAL MEDICINE

## 2022-06-20 PROCEDURE — 36415 COLL VENOUS BLD VENIPUNCTURE: CPT | Performed by: INTERNAL MEDICINE

## 2022-06-20 PROCEDURE — P9604 ONE-WAY ALLOW PRORATED TRIP: HCPCS | Performed by: INTERNAL MEDICINE

## 2022-06-22 ENCOUNTER — LAB REQUISITION (OUTPATIENT)
Dept: LAB | Facility: CLINIC | Age: 72
End: 2022-06-22
Payer: MEDICARE

## 2022-06-22 DIAGNOSIS — R04.2 HEMOPTYSIS: ICD-10-CM

## 2022-06-22 DIAGNOSIS — N18.9 CHRONIC KIDNEY DISEASE, UNSPECIFIED: ICD-10-CM

## 2022-06-23 LAB — HGB BLD-MCNC: 10.8 G/DL (ref 11.7–15.7)

## 2022-06-23 PROCEDURE — 85018 HEMOGLOBIN: CPT | Performed by: INTERNAL MEDICINE

## 2022-06-23 PROCEDURE — P9604 ONE-WAY ALLOW PRORATED TRIP: HCPCS | Performed by: INTERNAL MEDICINE

## 2022-06-23 PROCEDURE — 36415 COLL VENOUS BLD VENIPUNCTURE: CPT | Performed by: INTERNAL MEDICINE

## 2022-06-27 ENCOUNTER — LAB REQUISITION (OUTPATIENT)
Dept: LAB | Facility: CLINIC | Age: 72
End: 2022-06-27
Payer: MEDICARE

## 2022-06-27 DIAGNOSIS — N18.9 CHRONIC KIDNEY DISEASE, UNSPECIFIED: ICD-10-CM

## 2022-06-27 PROCEDURE — 36415 COLL VENOUS BLD VENIPUNCTURE: CPT | Performed by: NURSE PRACTITIONER

## 2022-06-27 PROCEDURE — P9604 ONE-WAY ALLOW PRORATED TRIP: HCPCS | Performed by: NURSE PRACTITIONER

## 2022-06-27 PROCEDURE — 82310 ASSAY OF CALCIUM: CPT | Performed by: NURSE PRACTITIONER

## 2022-06-28 LAB
ANION GAP SERPL CALCULATED.3IONS-SCNC: 15 MMOL/L (ref 5–18)
BUN SERPL-MCNC: 23 MG/DL (ref 8–28)
CALCIUM SERPL-MCNC: 9.7 MG/DL (ref 8.5–10.5)
CHLORIDE BLD-SCNC: 103 MMOL/L (ref 98–107)
CO2 SERPL-SCNC: 20 MMOL/L (ref 22–31)
CREAT SERPL-MCNC: 1.5 MG/DL (ref 0.6–1.1)
GFR SERPL CREATININE-BSD FRML MDRD: 36 ML/MIN/1.73M2
GLUCOSE BLD-MCNC: 172 MG/DL (ref 70–125)
POTASSIUM BLD-SCNC: 4.4 MMOL/L (ref 3.5–5)
SODIUM SERPL-SCNC: 138 MMOL/L (ref 136–145)

## 2022-06-29 ENCOUNTER — LAB REQUISITION (OUTPATIENT)
Dept: LAB | Facility: CLINIC | Age: 72
End: 2022-06-29

## 2022-06-29 LAB
ALBUMIN UR-MCNC: NEGATIVE MG/DL
APPEARANCE UR: CLEAR
BILIRUB UR QL STRIP: NEGATIVE
COLOR UR AUTO: ABNORMAL
GLUCOSE UR STRIP-MCNC: NEGATIVE MG/DL
HGB UR QL STRIP: NEGATIVE
HYALINE CASTS: 7 /LPF
KETONES UR STRIP-MCNC: NEGATIVE MG/DL
LEUKOCYTE ESTERASE UR QL STRIP: NEGATIVE
NITRATE UR QL: NEGATIVE
PH UR STRIP: 5 [PH] (ref 5–7)
RBC URINE: <1 /HPF
SP GR UR STRIP: 1.02 (ref 1–1.03)
UROBILINOGEN UR STRIP-MCNC: NORMAL MG/DL
WBC URINE: 0 /HPF

## 2022-06-29 PROCEDURE — 81001 URINALYSIS AUTO W/SCOPE: CPT | Performed by: INTERNAL MEDICINE

## 2022-07-03 ENCOUNTER — LAB REQUISITION (OUTPATIENT)
Dept: LAB | Facility: CLINIC | Age: 72
End: 2022-07-03
Payer: MEDICARE

## 2022-07-03 DIAGNOSIS — N18.9 CHRONIC KIDNEY DISEASE, UNSPECIFIED: ICD-10-CM

## 2022-07-04 ENCOUNTER — LAB REQUISITION (OUTPATIENT)
Dept: LAB | Facility: CLINIC | Age: 72
End: 2022-07-04
Payer: MEDICARE

## 2022-07-04 DIAGNOSIS — N18.9 CHRONIC KIDNEY DISEASE, UNSPECIFIED: ICD-10-CM

## 2022-07-05 LAB
ANION GAP SERPL CALCULATED.3IONS-SCNC: 16 MMOL/L (ref 7–15)
BUN SERPL-MCNC: 25 MG/DL (ref 8–23)
CALCIUM SERPL-MCNC: 9.5 MG/DL (ref 8.8–10.2)
CHLORIDE SERPL-SCNC: 101 MMOL/L (ref 98–107)
CREAT SERPL-MCNC: 1.33 MG/DL (ref 0.51–0.95)
DEPRECATED HCO3 PLAS-SCNC: 21 MMOL/L (ref 22–29)
GFR SERPL CREATININE-BSD FRML MDRD: 42 ML/MIN/1.73M2
GLUCOSE SERPL-MCNC: 130 MG/DL (ref 70–99)
HGB BLD-MCNC: 11.4 G/DL (ref 11.7–15.7)
POTASSIUM SERPL-SCNC: 3.9 MMOL/L (ref 3.4–5.3)
SODIUM SERPL-SCNC: 138 MMOL/L (ref 136–145)

## 2022-07-05 PROCEDURE — P9604 ONE-WAY ALLOW PRORATED TRIP: HCPCS | Performed by: INTERNAL MEDICINE

## 2022-07-05 PROCEDURE — 85018 HEMOGLOBIN: CPT | Performed by: INTERNAL MEDICINE

## 2022-07-05 PROCEDURE — 36415 COLL VENOUS BLD VENIPUNCTURE: CPT | Performed by: INTERNAL MEDICINE

## 2022-07-05 PROCEDURE — 82310 ASSAY OF CALCIUM: CPT | Performed by: INTERNAL MEDICINE

## 2022-07-31 ENCOUNTER — HEALTH MAINTENANCE LETTER (OUTPATIENT)
Age: 72
End: 2022-07-31

## 2022-12-14 PROBLEM — D18.00 CAVERNOUS ANGIOMA: Status: ACTIVE | Noted: 2020-09-04

## 2022-12-14 PROBLEM — G20.A1 PARKINSON DISEASE (H): Status: ACTIVE | Noted: 2022-03-17

## 2022-12-14 PROBLEM — I48.0 PAROXYSMAL ATRIAL FIBRILLATION (H): Status: ACTIVE | Noted: 2017-03-03

## 2022-12-14 PROBLEM — L03.116 CELLULITIS OF LEFT LOWER LIMB: Status: ACTIVE | Noted: 2022-05-26

## 2022-12-14 PROBLEM — I48.3 TYPICAL ATRIAL FLUTTER (H): Status: ACTIVE | Noted: 2017-05-23

## 2022-12-14 PROBLEM — I50.32 CHRONIC DIASTOLIC CONGESTIVE HEART FAILURE (H): Status: ACTIVE | Noted: 2022-01-19

## 2022-12-14 PROBLEM — M51.369 DDD (DEGENERATIVE DISC DISEASE), LUMBAR: Status: ACTIVE | Noted: 2019-07-22

## 2022-12-14 PROBLEM — G31.84 MINIMAL COGNITIVE IMPAIRMENT: Status: ACTIVE | Noted: 2022-02-17
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
## 2022-12-14 PROBLEM — Z91.148 NONCOMPLIANCE WITH MEDICATION REGIMEN: Status: ACTIVE | Noted: 2022-01-01

## 2022-12-14 PROBLEM — T50.905A PILL ESOPHAGITIS: Status: ACTIVE | Noted: 2022-05-20

## 2022-12-14 PROBLEM — Z96.652 HISTORY OF TOTAL KNEE ARTHROPLASTY, LEFT: Status: ACTIVE | Noted: 2018-12-20

## 2022-12-14 PROBLEM — I34.2 MITRAL VALVE STENOSIS, NON-RHEUMATIC: Status: ACTIVE | Noted: 2021-11-09

## 2022-12-14 PROBLEM — R62.7 FAILURE TO THRIVE IN ADULT: Status: ACTIVE | Noted: 2022-01-01

## 2022-12-14 PROBLEM — N18.30 CKD (CHRONIC KIDNEY DISEASE) STAGE 3, GFR 30-59 ML/MIN (H): Status: ACTIVE | Noted: 2017-03-03

## 2022-12-14 PROBLEM — K58.0 IRRITABLE BOWEL SYNDROME WITH DIARRHEA: Status: ACTIVE | Noted: 2021-07-02

## 2022-12-14 PROBLEM — R53.1 GENERALIZED WEAKNESS: Status: ACTIVE | Noted: 2022-06-01

## 2022-12-14 PROBLEM — I13.0 HYPERTENSIVE HEART AND CHRONIC KIDNEY DISEASE WITH HEART FAILURE AND STAGE 1 THROUGH STAGE 4 CHRONIC KIDNEY DISEASE, OR UNSPECIFIED CHRONIC KIDNEY DISEASE (H): Status: ACTIVE | Noted: 2022-05-25

## 2022-12-14 PROBLEM — Z85.828 HISTORY OF SQUAMOUS CELL CARCINOMA OF SKIN: Status: ACTIVE | Noted: 2017-11-09

## 2022-12-14 PROBLEM — H91.90 HEARING LOSS: Status: ACTIVE | Noted: 2022-01-01

## 2022-12-14 PROBLEM — Z86.018 HISTORY OF DYSPLASTIC NEVUS: Status: ACTIVE | Noted: 2021-12-17

## 2022-12-14 PROBLEM — I26.02 ACUTE SADDLE PULMONARY EMBOLISM WITH ACUTE COR PULMONALE (H): Status: ACTIVE | Noted: 2022-01-01

## 2022-12-14 PROBLEM — E87.6 HYPOKALEMIA: Status: ACTIVE | Noted: 2021-07-02
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
## 2022-12-14 PROBLEM — N18.9 CHRONIC RENAL FAILURE, UNSPECIFIED CKD STAGE: Status: ACTIVE | Noted: 2022-01-01

## 2022-12-14 PROBLEM — S92.231A: Status: ACTIVE | Noted: 2021-07-19

## 2022-12-14 PROBLEM — J44.9 CHRONIC OBSTRUCTIVE PULMONARY DISEASE, UNSPECIFIED (H): Status: ACTIVE | Noted: 2022-05-26

## 2022-12-14 PROBLEM — F33.1 MODERATE EPISODE OF RECURRENT MAJOR DEPRESSIVE DISORDER (H): Status: ACTIVE | Noted: 2022-01-01

## 2022-12-14 PROBLEM — N83.201 RIGHT OVARIAN CYST: Status: ACTIVE | Noted: 2018-04-03

## 2022-12-14 PROBLEM — K20.80 PILL ESOPHAGITIS: Status: ACTIVE | Noted: 2022-05-20

## 2022-12-14 PROBLEM — N32.81 OAB (OVERACTIVE BLADDER): Status: ACTIVE | Noted: 2020-05-18

## 2022-12-14 PROBLEM — Z86.73 HISTORY OF ARTERIAL ISCHEMIC STROKE: Status: ACTIVE | Noted: 2022-02-17

## 2022-12-14 PROBLEM — F41.1 GAD (GENERALIZED ANXIETY DISORDER): Status: ACTIVE | Noted: 2022-07-29

## 2022-12-14 NOTE — ED NOTES
Bed: ED28  Expected date:   Expected time:   Means of arrival:   Comments:  [facility] [place] - [phone] - 72 F weakness eta 1829

## 2022-12-14 NOTE — ED TRIAGE NOTES
Pt presents by [facility] from home with . Pt reports has been significantly weak over the past few days and had a syncopal episode at home. Pt denies injury from the syncopal event. Pt denies chest pain but reports feeling generally weak. Pt does have Parkinsons.   Glucose 232     Triage Assessment     Row Name 12/14/22 1306       Triage Assessment (Adult)    Airway WDL WDL       Respiratory WDL    Respiratory WDL WDL       Skin Circulation/Temperature WDL    Skin Circulation/Temperature WDL WDL       Cardiac WDL    Cardiac WDL WDL       Peripheral/Neurovascular WDL    Peripheral Neurovascular WDL WDL       Cognitive/Neuro/Behavioral WDL    Cognitive/Neuro/Behavioral WDL WDL

## 2022-12-14 NOTE — ED NOTES
DATE:  12/14/2022   TIME OF RECEIPT FROM LAB:  2:33 PM    LAB TEST:  troponin  LAB VALUE:  1093  RESULTS GIVEN WITH READ-BACK TO (PROVIDER):  Nely Ramirez MD  TIME LAB VALUE REPORTED TO PROVIDER:   2:33 PM

## 2022-12-14 NOTE — ED PROVIDER NOTES
History     Chief Complaint:  Generalized Weakness and Syncope       HPI   Jami Cordon is a 72 year old female with a long medical history who lives at home with her  presents by EMS with worsening weakness over the last 1 to 2 weeks.  She has not been vomiting, no nausea or diarrhea, no abdominal pain.  She denies chest pain or shortness of breath.  When EMS got there, she was sitting on the side of her bed but could not bear weight on her own on her legs because they were too weak.  Stroke evaluation was negative, blood sugar was 254.  Sats were 94% and her blood pressure was 118/79.  She has a home health aide that comes out twice a week and today apparently they were very concerned about her weakness getting worse and recommended she come in.  Apparently she did pass out briefly in bed twice today.  She did not fall recently.  She is on Eliquis for history of PE and A. fib.  No other associated signs or symptoms.    ROS:  Review of Systems   Constitutional: Negative for chills and fever.   Respiratory: Negative for cough and shortness of breath.    Cardiovascular: Negative for chest pain.   Gastrointestinal: Negative for abdominal pain, diarrhea and nausea.   Genitourinary: Negative for dysuria.   Neurological: Positive for weakness. Negative for facial asymmetry, speech difficulty, numbness and headaches.   All other systems reviewed and are negative.        Allergies:  Ciprofloxacin  Erythromycin  Latex  Penicillins  Atorvastatin  Exenatide  Liraglutide  Metformin  Oxybutynin  Pravastatin  Sulfa Drugs     Medications:    acetaminophen (TYLENOL) 500 MG tablet  ALPRAZolam (XANAX) 0.25 MG tablet  apixaban ANTICOAGULANT (ELIQUIS ANTICOAGULANT) 5 MG tablet  blood glucose (ACCU-CHEK GUIDE) test strip  Blood Glucose Monitoring Suppl (ACCU-CHEK GUIDE ME) w/Device KIT  budesonide-formoterol (SYMBICORT) 160-4.5 MCG/ACT Inhaler  buPROPion (WELLBUTRIN SR) 150 MG 12 hr tablet  buPROPion (WELLBUTRIN XL) 150 MG  24 hr tablet  buPROPion (WELLBUTRIN XL) 300 MG 24 hr tablet  citalopram (CELEXA) 20 MG tablet  Continuous Blood Gluc Sensor (FREESTYLE BETY 2 SENSOR) MISC  diltiazem ER (DILT-XR) 180 MG 24 hr capsule  fluconazole (DIFLUCAN) 150 MG tablet  fluocinonide (LIDEX) 0.05 % external solution  fluticasone (FLONASE) 50 MCG/ACT nasal spray  furosemide (LASIX) 20 MG tablet  ibuprofen (ADVIL/MOTRIN) 200 MG tablet  insulin glargine (LANTUS SOLOSTAR) 100 UNIT/ML pen  insulin lispro (HUMALOG KWIKPEN) 100 UNIT/ML (1 unit dial) KWIKPEN  insulin pen needle (NOVOFINE PLUS) 32G X 4 MM miscellaneous  Iron-Vitamin C (IRON 100/C) 100-250 MG TABS  Lancets MISC  levalbuterol (XOPENEX HFA) 45 MCG/ACT inhaler  levothyroxine (SYNTHROID/LEVOTHROID) 88 MCG tablet  naltrexone (REVIA) 1 mg/mL SOLN  norethindrone (AYGESTIN) 5 MG tablet  nystatin (MYCOSTATIN) 995433 UNIT/GM external cream  nystatin (MYCOSTATIN) 223871 UNIT/ML suspension  omeprazole (PRILOSEC) 40 MG DR capsule  oxyCODONE (ROXICODONE) 5 MG tablet  pravastatin (PRAVACHOL) 40 MG tablet  pyridOXINE (VITAMIN B-6) 50 MG tablet  rosuvastatin (CRESTOR) 5 MG tablet  senna-docusate (SENOKOT-S/PERICOLACE) 8.6-50 MG tablet  sodium fluoride dental gel (PREVIDENT) 1.1 % GEL topical gel  spironolactone (ALDACTONE) 25 MG tablet  topiramate (TOPAMAX) 50 MG tablet  traZODone (DESYREL) 50 MG tablet  triamcinolone (KENALOG) 0.1 % external cream  vitamin B-12 (CYANOCOBALAMIN) 1000 MCG CR tablet        Past Medical History:    No past medical history on file.    Past Surgical History:    No past surgical history on file.     Family History:    family history is not on file.    Social History:   reports that she has never smoked. She has never used smokeless tobacco.  PCP: No primary care provider on file.     Physical Exam     Patient Vitals for the past 24 hrs:   BP Temp Temp src Pulse Resp SpO2 Height Weight   12/14/22 1835 139/63 -- -- 84 16 98 % -- --   12/14/22 1805 (!) 152/83 -- -- 84 18 99 % -- --    12/14/22 1750 (!) 150/94 -- -- -- -- -- -- --   12/14/22 1656 (!) 141/84 -- -- 85 18 96 % -- --   12/14/22 1641 -- -- -- 84 17 95 % -- --   12/14/22 1626 (!) 159/89 -- -- 87 18 94 % -- --   12/14/22 1611 -- -- -- 86 21 96 % -- --   12/14/22 1559 (!) 155/85 -- -- 86 22 97 % -- --   12/14/22 1532 -- -- -- 79 20 95 % -- --   12/14/22 1456 -- -- -- 83 19 92 % -- --   12/14/22 1432 -- -- -- 86 22 94 % -- --   12/14/22 1426 (!) 148/75 -- -- 85 26 -- -- --   12/14/22 1402 -- -- -- 84 22 -- -- --   12/14/22 1332 138/79 -- -- 89 25 95 % -- --   12/14/22 1308 -- 97.3  F (36.3  C) Temporal -- -- -- -- --   12/14/22 1305 129/84 -- -- 92 20 93 % 1.524 m (5') 82.6 kg (182 lb)   12/14/22 1304 129/84 -- -- -- -- -- -- --        Physical Exam  Nursing note and vitals reviewed.    Constitutional:  Appears comfortable.    HENT:                Nose normal.  No discharge.      Oral mucosa is very dry.  Eyes:    Conjunctivae are normal without injection.  Pupils are equal.  Cardiovascular:  Normal rate, regular rhythm with normal S1 and S2.      Normal heart sounds and peripheral pulses 2+ and equal.       No murmur or iraj.  Pulmonary:  Effort normal and breath sounds clear to auscultation bilaterally.    No respiratory distress.  No stridor.     No wheezes. No rales.     GI:    Soft. No distension and no mass. No tenderness.      No flank pain.    Musculoskeletal:  Normal range of motion. No extremity deformity.     No edema and no tenderness.    Neurological:   Alert and oriented x3. No focal weakness.  No hand drift, no leg drift.  Equal  strength.  Generalized weakness noted.  No facial droop.  Speech is clear.  Visual acuity grossly normal.  Skin:    Skin is warm and dry. No rash noted.   Psychiatric:   Behavior is normal. Appropriate mood and affect.     Judgment and thought content normal.         Emergency Department Course   ECG:  ECG results from 12/14/22   EKG 12-lead, tracing only     Value    Systolic Blood Pressure      Diastolic Blood Pressure     Ventricular Rate 91    Atrial Rate 91    PA Interval 154    QRS Duration 84        QTc 482    P Axis 49    R AXIS -13    T Axis 41    Interpretation ECG      Sinus rhythm  Possible Left atrial enlargement  Cannot rule out Inferior infarct , age undetermined  Cannot rule out Anterior infarct , age undetermined  Abnormal ECG  No previous ECGs available  Confirmed by GENERATED REPORT, COMPUTER ([id number]),  Marcy Bang ([id number]) on 12/14/2022 2:10:56 PM         Imaging:  CT Chest Pulmonary Embolism w Contrast   Final Result   Abnormal   IMPRESSION:   1.  Saddle pulmonary embolism with large amount of thrombus noted in both the right and left main pulmonary arteries extending into the lobar and segmental branches of all lobes. Moderate right heart strain. Recommend consultation with interventional    radiology for further evaluation and management.         [Critical Result: Sagittal embolism with large pulmonary embolism noted bilaterally with moderate right heart strain.]      Finding was identified on 12/14/2022 6:32 PM.       Dr. Ramirez was contacted by me on 12/14/2022 6:38 PM and verbalized understanding of the critical result.          MR Brain w/o & w Contrast   Final Result   IMPRESSION:   1.  Small left parietal cavernous malformation without surrounding edema or findings of recent hemorrhage. This corresponds to the finding on recent CT.   2.  Otherwise, age-related changes without acute intracranial abnormality.      CT Head w/o Contrast   Final Result   IMPRESSION:   1. Subtle irregular small focus of hyperattenuation involving the   subcortical white matter of the left parietal lobe, nonspecific. This   could possibly represent a vascular lesion such as a cavernous   malformation, but a subtle focus of recent hemorrhage or small   intra-axial mass cannot be excluded. Recommend MRI of the brain   without and with contrast for further evaluation.   2. Chronic infarct  in the left occipital lobe.   3. Generalized brain atrophy and presumed chronic small vessel   ischemic change, as described.   4. Mild to moderate supratentorial ventriculomegaly. This is favored   to be due to ex vacuo dilatation in the setting of central white   matter volume loss and brain atrophy. A superimposed component of   communicating hydrocephalus is less likely, but not entirely excluded.      EPDRO BYERS MD            SYSTEM ID:  [id number]         Report per radiology    Laboratory:  Labs Ordered and Resulted from Time of ED Arrival to Time of ED Departure   COMPREHENSIVE METABOLIC PANEL - Abnormal       Result Value    Sodium 134      Potassium 4.3      Chloride 96      Carbon Dioxide (CO2) 25      Anion Gap 13      Urea Nitrogen 37 (*)     Creatinine 1.14 (*)     Calcium 9.5      Glucose 274 (*)     Alkaline Phosphatase 173 (*)     AST 36      ALT 11      Protein Total 6.7 (*)     Albumin 2.8 (*)     Bilirubin Total 1.3      GFR Estimate 51 (*)    TROPONIN I - Abnormal    Troponin I High Sensitivity 1,093 (*)    INR - Abnormal    INR 1.28 (*)    CBC WITH PLATELETS AND DIFFERENTIAL - Abnormal    WBC Count 9.5      RBC Count 5.17      Hemoglobin 13.2      Hematocrit 42.6      MCV 82      MCH 25.5 (*)     MCHC 31.0 (*)     RDW 16.0 (*)     Platelet Count 174      % Neutrophils 85      % Lymphocytes 8      % Monocytes 5      % Eosinophils 0      % Basophils 0      % Immature Granulocytes 2      NRBCs per 100 WBC 0      Absolute Neutrophils 8.0      Absolute Lymphocytes 0.8      Absolute Monocytes 0.5      Absolute Eosinophils 0.0      Absolute Basophils 0.0      Absolute Immature Granulocytes 0.2      Absolute NRBCs 0.0     NT PROBNP INPATIENT - Abnormal    N terminal Pro BNP Inpatient 9,445 (*)    D DIMER QUANTITATIVE - Abnormal    D-Dimer Quantitative 17.35 (*)    TSH WITH FREE T4 REFLEX - Normal    TSH 2.90     PARTIAL THROMBOPLASTIN TIME - Normal    aPTT 28     ROUTINE UA WITH MICROSCOPIC REFLEX  TO CULTURE            Emergency Department Course:      Reviewed:  I reviewed nursing notes, vitals and past medical history    Assessments:  1303 I obtained history and examined the patient as noted above.       Consults:   MD Dr. Brinda Perez from     Interventions:  Medications   0.9% sodium chloride BOLUS (0 mLs Intravenous Stopped 12/14/22 1432)     Followed by   sodium chloride 0.9% infusion (0 mLs Intravenous Stopped 12/14/22 1711)   heparin 25,000 units in 0.45% NaCl 250 mL ANTICOAGULANT infusion (1,500 Units/hr Intravenous New Bag 12/14/22 1853)   gadobutrol (GADAVIST) injection 8 mL (8 mLs Intravenous Given 12/14/22 1737)   sodium chloride (PF) 0.9% PF flush 10 mL (100 mLs Intravenous Given 12/14/22 1737)   iopamidol (ISOVUE-370) solution 69 mL (69 mLs Intravenous Given 12/14/22 1816)   Saline Flush (93 mLs Intravenous Given 12/14/22 1816)   heparin ANTICOAGULANT Loading dose for HIGH INTENSITY TREATMENT * Give BEFORE starting heparin infusion (6,600 Units Intravenous Given 12/14/22 1853)        Disposition:  The patient was admitted to the hospital under the care of Dr. Grider.     Impression & Plan        Medical Decision Making:  Patient presents with weakness and not taking her medications for the last couple of weeks due to multiple stomach or duodenal ulcers.  She has been off her Eliquis for at least 2 weeks if not longer according to her .  He has wanted to have hospice consulted on her.  The patient states she does not want to be resuscitated and is DNR/DNI.  She also does not want any aggressive treatment of anything.  Her vital signs are normal when she arrived.  No focal weakness to suggest a stroke.  Labs are obtained and she has renal failure with a BUN of 37 and GFR of 51, LFTs are normal except an elevated alk phos of 173.  BNP was very high at 9445, troponins up to 1093.  TSH is normal glucose 274 and her CBC is normal.  Because of the weakness and unexplained, I did  get a head CT initially and there was a questionable area that could have shown a little bit of blood and I recommend an MRI.  This was done and all of the changes were chronic with no acute bleeding.  I was concerned about this elevation in the BNP and troponin and no history of chest pain.  I thought about a possible PE and got a D-dimer and it was 17.35.  PE study was obtained and she has a large saddle embolus with acute cor pulmonale.  Interestingly her vitals have been totally normal and she has no symptoms at rest.  I talked with Dr. Parry from  and he did not recommend thrombectomy with normal vital signs.  The patient states that this time that she would not want thrombectomy and wants to be kept comfortable but is okay with heparin.  High intensity heparin is ordered.  I talked with Marek Grider MD will be admitting the patient [place] [facility] [place]        Diagnosis:    ICD-10-CM    1. Acute saddle pulmonary embolism with acute cor pulmonale (H)  I26.02       2. Generalized weakness  R53.1       3. Noncompliance with medication regimen  Z91.14       4. Chronic renal failure, unspecified CKD stage  N18.9       5. DNR (do not resuscitate)  Z66            Discharge Medications:  New Prescriptions    No medications on file        12/14/2022   Nely Ramirez MD Powell, Tracy Alan, MD  12/14/22 1916

## 2022-12-15 NOTE — PROGRESS NOTES
Interventional Radiology - Progress Note  Inpatient - [facility]  12/15/2022    IR Brief Note    IR consulted for evaluation for PE thrombectomy. Review of records and imaging demonstrate technical feasibility of procedure however we will wait hospice consult to determine if patient wishes to proceed with intervention. Discussed with Dr Davies who is in agreement with plan, reviewed with hospitalist as well.    Michele Shi PA-C  Interventional Radiology  [phone] (IR)  [phone] ([facility] Office)

## 2022-12-15 NOTE — PLAN OF CARE
[id number]  Patient Alert and oriented times four though slow to respond at times  Total care  Tele: SR  Heparin running at 1200 units/hr next PTT @ 0900  NS running at 75 ml/hr   Takes pills one by one after dipping them into water  Denies pain/SOB  Room air  Plan: PT, OT, pallative/hospice

## 2022-12-15 NOTE — PROGRESS NOTES
SPIRITUAL HEALTH SERVICES Progress Note  [facility] per consult    I visited with pt Delia and her spouse to facilitate a connection with pt's . I was able to get in touch with pt's , who plans to visit this afternoon. Pt denied any additional support needs at this time.    Addendum: Pt's spouse called Spiritual Health line shortly after our visit for tangential processing of pt's medical situation. I updated pt's nurse and a member of the palliative team.    Spiritual Health remains available for spiritual and emotional support upon request/need.    Sarah Johanson  Chaplain Resident  Spiritual Health Services  Pager: [phone]     [facility] available 24/7 for emergent requests/referrals, either by having the on-call  paged or by entering an ASAP/STAT consult in Epic (this will also page the on-call ).

## 2022-12-15 NOTE — PROGRESS NOTES
"Date: December 15, 2022  Shift: [id number]  Name: Jami Cordon  Age: 72 year old  YOB: 1950  Date of Admission: 12/14/2022    Reason for Admission: Generalized weakness [R53.1]  DNR (do not resuscitate) [Z66]  Noncompliance with medication regimen [Z91.14]  Acute saddle pulmonary embolism with acute cor pulmonale (H) [I26.02]  Chronic renal failure, unspecified CKD stage [N18.9]     Cognitive/Mentation: A/Ox3 - disoriented to situation, was stating to staff and family that she had a heart attack. Also forgetful. Cooperative but particular  Neuros/CMS: Pulses palpable in BLE's - BLE edema 2+     VS: VSS on RA  Cardiac: Tele NSR  GI: BM this shift - bowel sounds audible/normoactive; incontinent  : Bladder scan of 337, pt was able to void - urine meghna/clear. Will continue to monitor - straight cath above 450mL bladder scan per MD  Pulmonary: LS diminished - denies chest pain or SOB  Pain: Pain in vaginal area, noted redness. Purewick removed, barrier cream applied - will continue to monitor. Asked MD for prn tylenol, waiting to hear back.      Lines/Drains: PIV - heparin infusing @ 900u/hr, next PTT level at 2045; IVF @ 50mL/hr  Skin: WDL  Activity: T/R Q2; Not OOB, pt/family states pt is mostly bed bound at home.     Diet: Full liquid, pt does not have desire for more due to upset stomach - nausea overnight so \"taking it easy\"     NPO @ 00 for potential IR procedure.     Discharge: Pending     Shift summary: Potassium replaced, redraw at 2045.    Pt was seen by palliative (see note). Pt still unsure exactly what she wants for cares/interventions. Social work following. Friend/therapist Cammie, pt stated to be her primary contact, is listed in the chart with a phone number and would like updates when able. Pt's  is not a decision-maker and pt would like to oversee what staff tells him.     Pt's  states Carmen Sadler is pt's POA - #[phone] - will pass on to .     Pt refuses some " medications, only takes a select few.

## 2022-12-15 NOTE — CONSULTS
GASTROENTEROLOGY CONSULTATION      Jami Cordon  [address]  [facility] [id number]  72 year old female     Admission Date/Time: 12/14/2022  Primary Care Provider: Mary Kay Flannery     We were asked to see the patient in consultation by Dr. Lyles for evaluation of vomiting.    ASSESSMENT:    1.  Nausea vomiting-I did review her previous endoscopy and the pictures, these are smaller superficial ulcers, they do not have the appearance of something that could cause a gastric outlet obstruction, however, they certainly could have progressed, but this is unlikely since she was on PPI therapy.  - The next step in work-up would be to pursue a abdominal and pelvic CT scan with IV and oral contrast.  At this time, its not clear that this is in line with her current wishes.  Thus, we will hold off on further work-up until palliative care sees the patient.  Certainly, she is not a candidate for upper endoscopy, given the significant PE with right heart strain, she would have significant risk for upper endoscopy with sedation.  2.  Recent duodenal ulcers- continue twice daily PPI    RECOMMENDATIONS:  - Advance diet as tolerated  - PPI twice a day IV  - Await palliative care or hospice consult  - If patient desires further extensive medical management, then would order an abdominal pelvic CT scan with oral and IV contrast.    Tobin Acevedo MD   [facility]  [phone]      ________________________________________________________________________        HPI:  Jami Cordon is a 72 year old female who has an extensive past medical history including diabetes, hypertension, history of pulmonary embolism, atrial fibrillation on Eliquis, asthma, COPD, diastolic heart failure, chronic kidney disease, Parkinson's disease, recent duodenal ulcer (see below), anxiety, depression, overactive bladder.    Note, patient was sleepy during this consult.  She did not go into detail with regards to questioning.   She did give me permission to talk to her .  Of note, her  states that he has not her guardian, nor her power of .  Since the patient came a position to talk to him, we did discuss her recent symptoms and he reports that she had daily vomiting for a month.  She has lost 30 pounds.  She is on very little intake.  He does report that she tried to go into hospice in April 2022 and was denied hospice and she cried for days after this.  There is no hematemesis.  There is no melena or hematochezia.  No NSAID use. No reports of dysphagia.    The patient reports that since taking pantoprazole twice a day, she feels better, however, the  states that she still having vomiting on this.    After presenting to the emergency department, she was found to have significant elevation in her troponin and D-dimer.  CT the chest shows saddle PE.  No need for IR intervention, given hemodynamic stability.  Per the history and physical, patient does not wish for any aggressive intervention.     Recent work-up is detailed below:    ENDOSCOPIES  Upper endoscopy, 12/3/22 ([facility]) - Normal esophagus. Small hiatal hernia. Diffuse gastric erythema. Multiple superficial ulcers in the duodenum, 2-10mm. Gastric biopsies show non-erosive reactive gastropathy, negative for H. pylori. Duodenal biopsies show ulcerative duodenitis.    IMAGING  CT scan of the abdomen and pelvis with and without IV contrast, August 2022 -there is a 0.8 cm angiomyolipoma in the lower pole the right kidney.  Additional subcentimeter hypodensities in the right kidney which are too small to characterize.  No renal or ureteral stone.  No hydronephrosis.  No filling defects in the collecting systems.  1.8 cm right ovarian cyst is smaller than on previous study when it measured 2.9 cm    Right upper quadrant ultrasound, November 2022: Incidental findings, otherwise negative study.      ROS: A comprehensive ten point review of systems was negative aside  from those in mentioned in the HPI.      PAST MEDICAL HISTORY:  Diabetes type 2, hypertension, dyslipidemia, history of pulmonary embolism, paroxysmal atrial fibrillation on Eliquis, asthma, COPD, diastolic heart failure, chronic kidney disease stage III, hypothyroidism, GERD, duodenal ulcer, IBS, anxiety, depression, SANDI, Parkinson's disease, overactive bladder    PAST SURGICAL HISTORY:  No past surgical history on file.     SOCIAL HISTORY:  Social History     Tobacco Use     Smoking status: Never     Smokeless tobacco: Never     FAMILY HISTORY:  No family history on file.  ALLERGIES:   Allergies   Allergen Reactions     Ciprofloxacin Hives and Rash     Erythromycin Hives and Rash     Latex Unknown     Other reaction(s): *Unknown     Penicillins Hives     However she tolerates cephalexin (Kelfex)       Atorvastatin Muscle Pain (Myalgia)     muscle weakness  muscle weakness       Exenatide Diarrhea     Other reaction(s): Gastrointestinal     Liraglutide Nausea and Vomiting     Did not tolerate, even with low dose.      Metformin Diarrhea and Nausea     Oxybutynin Other (See Comments)     Dry mouth, nausea, confusion     Pravastatin      Other reaction(s): Myalgias     Sulfa Drugs Hives     MEDICATIONS:   Prior to Admission medications    Medication Sig Start Date End Date Taking? Authorizing Provider   acetaminophen (TYLENOL) 32 mg/mL liquid Take 992 mg by mouth every 8 hours as needed for fever or mild pain   Yes Unknown, Entered By History   albuterol (PROAIR HFA/PROVENTIL HFA/VENTOLIN HFA) 108 (90 Base) MCG/ACT inhaler Inhale 2 puffs into the lungs every 6 hours as needed for shortness of breath, wheezing or cough   Yes Unknown, Entered By History   apixaban ANTICOAGULANT (ELIQUIS) 5 MG tablet TAKE 1 TABLET BY MOUTH TWICE DAILY 3/26/21  Yes Reported, Patient   budesonide-formoterol (SYMBICORT) 160-4.5 MCG/ACT Inhaler INHALE 2 PUFFS BY MOUTH TWICE DAILY. RINSE MOUTH/ GARGLE AFTER USE 8/8/20  Yes Reported, Patient    bumetanide (BUMEX) 1 MG tablet Take 2 mg by mouth daily   Yes Unknown, Entered By History   buPROPion (WELLBUTRIN XL) 150 MG 24 hr tablet Take 150 mg by mouth every morning   Yes Reported, Patient   buPROPion (WELLBUTRIN XL) 300 MG 24 hr tablet Take 300 mg by mouth every morning   Yes Reported, Patient   carbidopa-levodopa (SINEMET CR)  MG CR tablet Take 1 tablet by mouth At Bedtime   Yes Unknown, Entered By History   carbidopa-levodopa (SINEMET)  MG tablet Take 2 tablets by mouth 4 times daily   Yes Unknown, Entered By History   citalopram (CELEXA) 20 MG tablet Take 40 mg by mouth daily 1/28/21  Yes Reported, Patient   diclofenac (VOLTAREN) 1 % topical gel Apply topically 4 times daily as needed for moderate pain (4-6)   Yes Unknown, Entered By History   diltiazem ER (DILT-XR) 180 MG 24 hr capsule Take 180 mg by mouth daily 3/9/21  Yes Reported, Patient   donepezil (ARICEPT) 10 MG tablet Take 10 mg by mouth At Bedtime   Yes Unknown, Entered By History   fluticasone (FLONASE) 50 MCG/ACT nasal spray SHAKE LIQUID AND USE 2 SPRAYS IN EACH NOSTRIL DAILY 11/2/20  Yes Reported, Patient   hydrOXYzine (ATARAX) 10 MG tablet Take 10 mg by mouth 3 times daily as needed for itching   Yes Unknown, Entered By History   insulin glargine (LANTUS PEN) 100 UNIT/ML pen Inject 28 Units Subcutaneous At Bedtime 10/12/20  Yes Reported, Patient   insulin lispro (HUMALOG KWIKPEN) 100 UNIT/ML (1 unit dial) KWIKPEN 18-20 units AM, 4-5 UNITS before LUNCH, 10-12 UNITS before DINNER + SS 1/50>150.  TDD 40-50 units. 10/12/20  Yes Reported, Patient   isosorbide mononitrate (IMDUR) 30 MG 24 hr tablet Take 30 mg by mouth daily   Yes Unknown, Entered By History   levothyroxine (SYNTHROID/LEVOTHROID) 88 MCG tablet Take 88 mcg by mouth daily 5/19/20  Yes Reported, Patient   naltrexone (REVIA) 1 mg/mL SOLN Take 4 mg by mouth At Bedtime   Yes Unknown, Entered By History   naproxen (NAPROSYN) 500 MG tablet Take 500 mg by mouth 2 times daily  as needed   Yes Unknown, Entered By History   nystatin (MYCOSTATIN) 925924 UNIT/GM external cream Apply topically daily as needed 7/2/20  Yes Reported, Patient   ondansetron (ZOFRAN) 4 MG tablet Take by mouth every 8 hours as needed for nausea   Yes Unknown, Entered By History   oxyCODONE (ROXICODONE) 5 MG tablet Take 2.5-5 mg by mouth every 6 hours as needed 2/26/21  Yes Reported, Patient   pantoprazole (PROTONIX) 20 MG EC tablet Take 40 mg by mouth 2 times daily (with meals)   Yes Unknown, Entered By History   rosuvastatin (CRESTOR) 5 MG tablet Take 5 mg by mouth daily 10/6/20 12/14/22 Yes Reported, Patient   senna-docusate (SENOKOT-S/PERICOLACE) 8.6-50 MG tablet Take 2 tablets by mouth daily as needed 11/19/20  Yes Reported, Patient   spironolactone (ALDACTONE) 25 MG tablet Take 25 mg by mouth daily 10/14/20 12/14/22 Yes Reported, Patient   topiramate (TOPAMAX) 50 MG tablet Take 50 mg by mouth 3 times daily 3/2/21  Yes Reported, Patient   traZODone (DESYREL) 50 MG tablet Take  mg by mouth At Bedtime 5/19/20  Yes Reported, Patient   blood glucose (ACCU-CHEK GUIDE) test strip Use one test strip to monitor blood glucose six times daily. 10/16/20   Reported, Patient   Blood Glucose Monitoring Suppl (ACCU-CHEK GUIDE ME) w/Device KIT Use as directed. 10/12/20   Reported, Patient   Continuous Blood Gluc Sensor (FREESTYLE BETY 2 SENSOR) MISC 1 each 3/11/21   Reported, Patient   insulin pen needle (NOVOFINE PLUS) 32G X 4 MM miscellaneous Inject 5 each Subcutaneous 7/13/20   Reported, Patient   Lancets MISC 1 each 10/12/20   Reported, Patient   rivastigmine (EXELON) 4.6 MG/24HR 24 hr patch Place 1 patch onto the skin daily As of 12/14/22 has not started.    Unknown, Entered By History   sodium fluoride dental gel (PREVIDENT) 1.1 % GEL topical gel BRUSH BID QAM AND PM. NO WATER RINSE AFTER 7/5/20   Reported, Patient     PHYSICAL EXAM:   BP (!) 147/82   Pulse 68   Temp 97.8  F (36.6  C) (Axillary)   Resp 18   Ht  1.524 m (5')   Wt 76 kg (167 lb 8.8 oz)   SpO2 94%   BMI 32.72 kg/m     GEN: Alert, NAD.  DUKE: AT, anicteric, OP without erythema, exudate, or ulcers.    LYMPH: No LAD noted.  HRT: RRR, no SATNAM  LUNGS: CTA  ABD: +BS, non-tender, non-distended, no rebound or guarding.  SKIN: No rash, jaundice   NEURO: MS at baseline?      ADDITIONAL DATA:   I reviewed the patient's new clinical lab test results.   Recent Labs   Lab Test 12/14/22  1523 07/05/22  0945 06/23/22  0812 06/20/22  0845   WBC 9.5  --   --  6.8   HGB 13.2 11.4* 10.8* 12.3   MCV 82  --   --  84     --   --  314   INR 1.28*  --   --   --      Recent Labs   Lab Test 12/15/22  0840 12/15/22  0216 12/14/22  2351 12/14/22  1340 07/05/22  0945 06/27/22  1024   NA  --   --   --  134 138 138   POTASSIUM  --   --   --  4.3 3.9 4.4   CHLORIDE  --   --   --  96 101 103   CO2  --   --   --  25 21* 20*   BUN  --   --   --  37* 25.0* 23   CR  --   --   --  1.14* 1.33* 1.50*   ANIONGAP  --   --   --  13 16* 15   CHRISTOPHER  --   --   --  9.5 9.5 9.7   * 131* 146* 274* 130* 172*     Recent Labs   Lab Test 12/14/22  1340 06/29/22  1800 12/10/18  1200   ALBUMIN 2.8*  --   --    BILITOTAL 1.3  --   --    ALT 11  --   --    AST 36  --   --    PROTEIN  --  Negative Negative        I reviewed the patient's new imaging results.     CT SCAN OF THE HEAD WITHOUT CONTRAST   12/14/2022 3:11 PM      HISTORY: On Eliquis. Significant weakness. Evaluate for spontaneous  bleed.     TECHNIQUE: Axial images of the head and coronal reformations without  IV contrast material. Radiation dose for this scan was reduced using  automated exposure control, adjustment of the mA and/or kV according  to patient size, or iterative reconstruction technique.     COMPARISON: None.     FINDINGS: The supratentorial ventricular system appears mild to  moderately dilated, slightly disproportionate to the degree of sulcal  prominence. This is likely related to ex vacuo dilatation in the  setting of  central white matter volume loss/atrophy. A superimposed  component of communicating hydrocephalus is thought to be less likely.  Mild generalized brain parenchymal volume loss. Mild patchy  nonspecific hypoattenuation of the cerebral white matter, likely due  to chronic small vessel ischemic disease. Small area of  gliosis/encephalomalacia involving the cortex and subcortical white  matter of the left occipital lobe, which may be due to chronic  infarct. There is a subtle small area of nonspecific hyperattenuation  in the subcortical white matter of the left parietal lobe measuring up  to 8-9 mm (series 3 image 19, series 5 image 40), nonspecific.  Scattered atherosclerotic calcifications are present. No extra axial  fluid collection or significant mass effect/herniation.     The visualized portions of the sinuses and mastoids appear normal. The  bony calvarium and bones of the skull base appear intact. Ovoid  circumscribed partially calcified lesion in the subcutaneous soft  tissues overlying the right parietal calvarium (series 5 image 37)  measuring 8-9 mm probably representing a sebaceous cyst or epidermal  inclusion cyst.                                                                      IMPRESSION:  1. Subtle irregular small focus of hyperattenuation involving the  subcortical white matter of the left parietal lobe, nonspecific. This  could possibly represent a vascular lesion such as a cavernous  malformation, but a subtle focus of recent hemorrhage or small  intra-axial mass cannot be excluded. Recommend MRI of the brain  without and with contrast for further evaluation.  2. 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  3. Generalized brain atrophy and presumed chronic small vessel  ischemic change, as described.  4. Mild to moderate supratentorial ventriculomegaly. This is favored  to be due to ex vacuo dilatation in the setting of central white  matter volume loss and brain atrophy. A superimposed  component of  communicating hydrocephalus is less likely, but not entirely excluded.    EXAM: MR BRAIN W/O and W CONTRAST  LOCATION: [facility]  DATE/TIME: 12/14/2022 5:52 PM     INDICATION: Abnormality on CT scan recommended MRI by radiology  COMPARISON:  Same day CT head without contrast, CT head 04/25/2021.  CONTRAST: 8mL Gadavist  TECHNIQUE: Routine multiplanar multisequence head MRI without and with intravenous contrast.     FINDINGS:  INTRACRANIAL CONTENTS: 8 mm region of susceptibility in the left parietal lobe with hyperintense T1 and T2 signal, compatible with a cavernous malformation. No surrounding edema or findings of recent hemorrhage. Additional scattered remote   microhemorrhages, with one in the right anterior temporal lobe which could also represent a small cavernous malformation. No acute or subacute infarct. No acute hemorrhage or extra-axial fluid collections. Patchy nonspecific T2/FLAIR hyperintensities   within the cerebral white matter most consistent with mild to moderate chronic microvascular ischemic change. Small areas of encephalomalacia involving the bilateral precentral gyri and left occipital lobe. Mild generalized cerebral atrophy. No   hydrocephalus. Normal position of the cerebellar tonsils. No pathologic contrast enhancement.     SELLA: No abnormality accounting for technique.     OSSEOUS STRUCTURES/SOFT TISSUES: Normal marrow signal. The major intracranial vascular flow voids are maintained. Incidental pontine capillary telangiectasia.     ORBITS: No abnormality accounting for technique.      SINUSES/MASTOIDS: No paranasal sinus mucosal disease. No middle ear or mastoid effusion.                                                                       IMPRESSION:  1.  Small left parietal cavernous malformation without surrounding edema or findings of recent hemorrhage. This corresponds to the finding on recent CT.  2.  Otherwise, age-related changes without  acute intracranial abnormality.    EXAM: CT CHEST PULMONARY EMBOLISM W CONTRAST  LOCATION: [facility]  DATE/TIME: 12/14/2022 6:31 PM     INDICATION: Elevated D dimer, BNP and troponin  COMPARISON: None.  TECHNIQUE: CT chest pulmonary angiogram during arterial phase injection of IV contrast. Multiplanar reformats and MIP reconstructions were performed. Dose reduction techniques were used.   CONTRAST: 69 mL Isovue 370     FINDINGS:  ANGIOGRAM CHEST: Large amount of pulmonary embolism is noted with saddle embolism with scoliosis of the right and left main pulmonary artery. Large amount of embolism is noted in both the right and left main pulmonary artery extending into the lobar and   segmental branches of all lobes. Thoracic aorta is negative for dissection. Moderate right heart strain.     LUNGS AND PLEURA: Minimal bibasilar atelectasis. No focal consolidation or effusion.     MEDIASTINUM/AXILLAE: Heart is normal in size. No adenopathy.     CORONARY ARTERY CALCIFICATION: None.     UPPER ABDOMEN: Normal.     MUSCULOSKELETAL: Degenerative changes of the spine.                                                                      IMPRESSION:  1.  Saddle pulmonary embolism with large amount of thrombus noted in both the right and left main pulmonary arteries extending into the lobar and segmental branches of all lobes. Moderate right heart strain. Recommend consultation with interventional   radiology for further evaluation and management.    EXAM: US LOWER EXTREMITY VENOUS DUPLEX BILATERAL  LOCATION: [facility]  DATE/TIME: 12/14/2022 10:58 PM     INDICATION: Saddle pulmonary embolism.  COMPARISON: None.  TECHNIQUE: Venous Duplex ultrasound of bilateral lower extremities with and without compression, augmentation and duplex. Color flow and spectral Doppler with waveform analysis performed.     FINDINGS: Exam includes the common femoral, femoral, popliteal veins as well  as segmentally visualized deep calf veins and greater saphenous vein.      RIGHT: No deep vein thrombosis. No superficial thrombophlebitis. No popliteal cyst.     LEFT: Nonocclusive DVT in the left popliteal, posterior tibial, and peroneal veins. No superficial thrombophlebitis. No popliteal cyst.                                                                      IMPRESSION:  1.  LEFT LEG: Positive for nonocclusive DVT in the popliteal, posterior tibial, and peroneal veins.  2.  RIGHT LEG: Negative for DVT.

## 2022-12-15 NOTE — PROGRESS NOTES
RECEIVING UNIT ED HANDOFF REVIEW    ED Nurse Handoff Report was reviewed by: Brittany Vaughn RN on December 14, 2022 at 9:01 PM

## 2022-12-15 NOTE — PROGRESS NOTES
[facility]    Medicine Progress Note - Hospitalist Service    Date of Admission:  12/14/2022    Assessment & Plan      Jami Cordon is a 72 year old female with diabetes, hypertension, dyslipidemia, h/o pulmonary embolism, paroxysmal atrial fibrillation (on Eliquis), asthma/COPD, diastolic CHF, CKDIII, hypothyroidism, GERD, duodenal ulcer, IBS, anxiety/depression, SANDI, Parkinson's disease, overactive bladder who presented to ED on 12/14/2022 with generalized weakness, failure to thrive, dyspnea on exertion.  She was found to have acute PE with saddle pulmonary embolism with right heart strain.     She has had recent hospitalizations for nausea and vomiting. She was recently admitted from 11/1 to 11/15/22 due to nausea and vomiting.  Underwent EGD on 12/3/2022 byMn and found to have duodenal ulcers.  Was switched from omeprazole to Protonix twice daily.  Continues to have nausea and vomiting and poor oral intake.  Has not been taking her medications.     is unable to take care of patient at home.  Patient interested in comfort focused treatment and hospice after discharge.       Acute saddle pulmonary embolism with right heart strain  H/o PE (on Eliquis PTA)  NSTEMI due to type II MI-secondary to acute PE with right heart strain  medication noncompliance  Nonocclusive DVT in left popliteal, posterior, tibial and peroneal veins  [place] [facility] [place]D dimer elevated at 17.35; hemodynamically stable and denies any chest pain  -CT chest 12/14-showed Saddle pulmonary embolism with large amount of thrombus in both the right and left main pulmonary arteries extending into the lobar and segmental branches of all lobes. Moderate right heart strain.   -Lower extremity ultrasound showed nonocclusive DVT in left lower extremity  -Echo 12/15 showed normal LV systolic function with EF 65-70%, no wall motion abnormalities, normal RV size and systolic function.  -pulmonary  embolism is likely due to noncompliance with anticoagulation due to poor PO intake/nausea and vomiting (see discussion below)    - IR consulted.  Patient would benefit from thrombectomy but has declined invasive procedures.    -On IV heparin infusion, will continue   -We will rediscussed thrombectomy with patient if she worsens  -Palliative care consulted.  No plan to escalate cares to ICU levels if she worsens         Nausea/vomiting  duodenal ulcer  poor PO intake  failure to thrive  goals of care  - She has had recent hospitalizations for episodic nausea and vomiting. She was recently admitted from 11/1 to 11/15/22. She was seen by Ariella COOLEY and had EGD on 12/3/22 which was noted with duodenal ulcers. Her Prilosec was changed to Protonix BID.   - she has had continued nausea and vomiting and thus has had very poor PO intake and has not been taking her medications.  is unable to take care of patient at home and even discussed hospice cares with her PCP during recent visit on 12/6/22.  -Palliative care consulted.  Patient wants comfort focused care and will likely pursue hospice after discharge.  She does not want invasive interventions but is okay with medical management.    -GI consulted  -Continue IV Protonix 40 mg BID  -PRN antiemetics  -Switch to half NS with 20 mEq KCl at 50 mill per hour        Diabetes mellitus type II with long-term use of insulin  -PTA on Lantus and NovoLog  -has not been taking her medications due to very poor PO intake  -will hold off on PTA insulin regimen  -Continue sliding scale insulin     Hypertension  Dyslipidemia  paroxysmal atrial fibrillation  diastolic CHF  -again as noted patient has not been taking her medications recently  - Continue diltiazem, Aldactone if patient agrees  -hold off on PTA Bumex given poor PO intake and dehydration  -IVF as above; monitor volume status closely  -will hold off on PTA Eliquis while on Heparin drip as above for saddle  PE     Hypothyroidism  -continue PTA Synthroid     Anxiety/depression  Parkinson's disease  -Continue Sinemet, trazodone, Celexa, Wellbutrin, Aricept, Topamax     Asthma/COPD  -respiratory status currently stable despite saddle PE  -Continue inhalers including Symbicort     CKD stage III  -BUN 37, creatinine 1.14; clinically dehydrated; monitor BMP with hydration     Hypokalemia, potassium 3  -Placed on potassium replacement protocol       Diet: Advance Diet as Tolerated: Full Liquid Diet    DVT Prophylaxis: IV heparin infusion  Hussein Catheter: Not present  Central Lines: None  Cardiac Monitoring: ACTIVE order. Indication: saddle PE with right heart strain  Code Status: No CPR- Do NOT Intubate      Disposition Plan      Expected Discharge Date: 12/16/2022                The patient's care was discussed with the Bedside Nurse and Patient.    Whitney Lyles MD  Hospitalist Service  [facility]  Securely message with the Vocera Web Console (learn more here)  Text page via [employer] Paging/Directory         Clinically Significant Risk Factors Present on Admission        # Hypokalemia: Lowest K = 3 mmol/L in last 2 days, will replace as needed  # Hyponatremia: Lowest Na = 134 mmol/L in last 2 days, will monitor as appropriate   # Hypercalcemia: corrected calcium is >10.1, will monitor as appropriate    # Hypoalbuminemia: Lowest albumin = 2.8 g/dL at 12/14/2022  1:40 PM, will monitor as appropriate  # Drug Induced Coagulation Defect: home medication list includes an anticoagulant medication         # Obesity: Estimated body mass index is 32.72 kg/m  as calculated from the following:    Height as of this encounter: 1.524 m (5').    Weight as of this encounter: 76 kg (167 lb 8.8 oz).           ______________________________________________________________________    Interval History   Patient reports she feels okay.  She does not want any invasive procedures.  Denies any chest pain or shortness of  breath  Has difficulty understanding her situation regarding pulmonary embolism  Continues to have intermittent nausea    Data reviewed today: I reviewed all medications, new labs and imaging results over the last 24 hours. I personally reviewed no images or EKG's today.    Physical Exam   Vital Signs: Temp: 97.8  F (36.6  C) Temp src: Axillary BP: 101/59 Pulse: 92   Resp: 18 SpO2: 98 % O2 Device: None (Room air)    Weight: 167 lbs 8.79 oz    Constitutional-patient is awake and alert, resting in bed, in no acute distress  Cardiovascular-regular rate and rhythm, no murmurs, no edema  Pulmonary-lungs are clear to auscultation bilaterally, no wheezing or rhonchi  GI-abdomen is soft, nontender, nondistended, no hepatosplenomegaly or masses  Integumentary-skin is warm and dry, no rashes or ulcers  Neurological-patient is awake, alert and oriented x3.  Moving all 4 extremities, normal speech, no focal deficits    Data   Recent Labs   Lab 12/15/22  1207 12/15/22  0840 12/15/22  0216 12/14/22  2351 12/14/22  1523 12/14/22  1340   WBC  --   --   --   --  9.5  --    HGB  --   --   --   --  13.2  --    MCV  --   --   --   --  82  --    PLT  --   --   --   --  174  --    INR  --   --   --   --  1.28*  --      --   --   --   --  134   POTASSIUM 3.0*  --   --   --   --  4.3   CHLORIDE 106  --   --   --   --  96   CO2 23  --   --   --   --  25   BUN 23  --   --   --   --  37*   CR 0.89  --   --   --   --  1.14*   ANIONGAP 13  --   --   --   --  13   CHRISTOPHER 8.5  --   --   --   --  9.5   * 155* 131*   < >  --  274*   ALBUMIN  --   --   --   --   --  2.8*   PROTTOTAL  --   --   --   --   --  6.7*   BILITOTAL  --   --   --   --   --  1.3   ALKPHOS  --   --   --   --   --  173*   ALT  --   --   --   --   --  11   AST  --   --   --   --   --  36    < > = values in this interval not displayed.     Recent Results (from the past 24 hour(s))   MR Brain w/o & w Contrast    Narrative    EXAM: MR BRAIN W/O and W CONTRAST  LOCATION: [facility]  DATE/TIME: 12/14/2022 5:52 PM    INDICATION: Abnormality on CT scan recommended MRI by radiology  COMPARISON:  Same day CT head without contrast, CT head 04/25/2021.  CONTRAST: 8mL Gadavist  TECHNIQUE: Routine multiplanar multisequence head MRI without and with intravenous contrast.    FINDINGS:  INTRACRANIAL CONTENTS: 8 mm region of susceptibility in the left parietal lobe with hyperintense T1 and T2 signal, compatible with a cavernous malformation. No surrounding edema or findings of recent hemorrhage. Additional scattered remote   microhemorrhages, with one in the right anterior temporal lobe which could also represent a small cavernous malformation. No acute or subacute infarct. No acute hemorrhage or extra-axial fluid collections. Patchy nonspecific T2/FLAIR hyperintensities   within the cerebral white matter most consistent with mild to moderate chronic microvascular ischemic change. Small areas of encephalomalacia involving the bilateral precentral gyri and left occipital lobe. Mild generalized cerebral atrophy. No   hydrocephalus. Normal position of the cerebellar tonsils. No pathologic contrast enhancement.    SELLA: No abnormality accounting for technique.    OSSEOUS STRUCTURES/SOFT TISSUES: Normal marrow signal. The major intracranial vascular flow voids are maintained. Incidental pontine capillary telangiectasia.    ORBITS: No abnormality accounting for technique.     SINUSES/MASTOIDS: No paranasal sinus mucosal disease. No middle ear or mastoid effusion.       Impression    IMPRESSION:  1.  Small left parietal cavernous malformation without surrounding edema or findings of recent hemorrhage. This corresponds to the finding on recent CT.  2.  Otherwise, age-related changes without acute intracranial abnormality.   CT Chest Pulmonary Embolism w Contrast   Result Value    Radiologist flags (AA)     Sagittal embolism with large pulmonary embolism noted bilaterally with  moderate right heart strain.    Narrative    EXAM: CT CHEST PULMONARY EMBOLISM W CONTRAST  LOCATION: [facility]  DATE/TIME: 12/14/2022 6:31 PM    INDICATION: Elevated D dimer, BNP and troponin  COMPARISON: None.  TECHNIQUE: CT chest pulmonary angiogram during arterial phase injection of IV contrast. Multiplanar reformats and MIP reconstructions were performed. Dose reduction techniques were used.   CONTRAST: 69 mL Isovue 370    FINDINGS:  ANGIOGRAM CHEST: Large amount of pulmonary embolism is noted with saddle embolism with scoliosis of the right and left main pulmonary artery. Large amount of embolism is noted in both the right and left main pulmonary artery extending into the lobar and   segmental branches of all lobes. Thoracic aorta is negative for dissection. Moderate right heart strain.    LUNGS AND PLEURA: Minimal bibasilar atelectasis. No focal consolidation or effusion.    MEDIASTINUM/AXILLAE: Heart is normal in size. No adenopathy.    CORONARY ARTERY CALCIFICATION: None.    UPPER ABDOMEN: Normal.    MUSCULOSKELETAL: Degenerative changes of the spine.      Impression    IMPRESSION:  1.  Saddle pulmonary embolism with large amount of thrombus noted in both the right and left main pulmonary arteries extending into the lobar and segmental branches of all lobes. Moderate right heart strain. Recommend consultation with interventional   radiology for further evaluation and management.      [Critical Result: Sagittal embolism with large pulmonary embolism noted bilaterally with moderate right heart strain.]    Finding was identified on 12/14/2022 6:32 PM.     Dr. Ramirez was contacted by me on 12/14/2022 6:38 PM and verbalized understanding of the critical result.      US Lower Extremity Venous Duplex Bilateral    Narrative    EXAM: US LOWER EXTREMITY VENOUS DUPLEX BILATERAL  LOCATION: [facility]  DATE/TIME: 12/14/2022 10:58 PM    INDICATION: Saddle pulmonary  embolism.  COMPARISON: None.  TECHNIQUE: Venous Duplex ultrasound of bilateral lower extremities with and without compression, augmentation and duplex. Color flow and spectral Doppler with waveform analysis performed.    FINDINGS: Exam includes the common femoral, femoral, popliteal veins as well as segmentally visualized deep calf veins and greater saphenous vein.     RIGHT: No deep vein thrombosis. No superficial thrombophlebitis. No popliteal cyst.    LEFT: Nonocclusive DVT in the left popliteal, posterior tibial, and peroneal veins. No superficial thrombophlebitis. No popliteal cyst.      Impression    IMPRESSION:  1.  LEFT LEG: Positive for nonocclusive DVT in the popliteal, posterior tibial, and peroneal veins.  2.  RIGHT LEG: Negative for DVT.    I discussed the findings with the patient's nurse, Belia, at 11:30 PM on 2022.   Echocardiogram Complete   Result Value    LVEF  65-70%    Narrative    [id number]  [id number]  [id number]  [id number]^KYE^SONIA^MARIANA     [facility]  Echocardiography Laboratory  [address] [place] [address]     Name: KIRK IBARRA  MRN: [id number]  : 1950  Study Date: 12/15/2022 10:02 AM  Age: 72 yrs  Gender: Female  Patient Location: [facility]  Reason For Study: Pulmonary Emboli  Ordering Physician: SONIA FISHMAN  Referring Physician: SONIA FISHMAN  Performed By: Ángel Liao     BSA: 1.8 m2  Height: 60 in  Weight: 180 lb  HR: 66  BP: 138/96 mmHg  ______________________________________________________________________________  Procedure  Complete Echo Adult.  ______________________________________________________________________________  Interpretation Summary     Left ventricular systolic function is normal.  The visual ejection fraction is 65-70%.  No regional wall motion abnormalities noted.  There is mild (1+) aortic regurgitation.  There is mild (1+) mitral regurgitation.  The right ventricle is normal size.  The right  ventricular systolic function is normal.  The study was technically difficult. There is no comparison study available.  ______________________________________________________________________________  Left Ventricle  The left ventricle is normal in size. There is normal left ventricular wall  thickness. Left ventricular systolic function is normal. The visual ejection  fraction is 65-70%. Grade I or early diastolic dysfunction. No regional wall  motion abnormalities noted.     Right Ventricle  The right ventricle is normal size. The right ventricular systolic function is  normal.     Atria  The left atrium is mildly dilated. Right atrial size is normal.     Mitral Valve  There is mild mitral annular calcification. The mitral valve leaflets are  mildly thickened. There is mild (1+) mitral regurgitation.     Tricuspid Valve  No tricuspid regurgitation. Right ventricular systolic pressure could not be  approximated due to inadequate tricuspid regurgitation. IVC diameter <2.1 cm  collapsing >50% with sniff suggests a normal RA pressure of 3 mmHg.     Aortic Valve  There is mild trileaflet aortic sclerosis. There is mild (1+) aortic  regurgitation. No aortic stenosis is present.     Pulmonic Valve  The pulmonic valve is not well visualized.     Vessels  The aortic root is normal size.     Pericardium  There is no pericardial effusion.     Rhythm  Sinus rhythm was noted.  ______________________________________________________________________________  MMode/2D Measurements & Calculations  IVSd: 1.0 cm     LVIDd: 4.0 cm  LVIDs: 2.5 cm  LVPWd: 0.93 cm  FS: 37.7 %  LV mass(C)d: 123.9 grams  LV mass(C)dI: 69.4 grams/m2  Ao root diam: 2.8 cm  asc Aorta Diam: 3.3 cm  LVOT diam: 2.0 cm  LVOT area: 3.0 cm2  LA Volume (BP): 65.4 ml  LA Volume Index (BP): 36.7 ml/m2  RWT: 0.46     Doppler Measurements & Calculations  MV E max arron: 77.1 cm/sec  MV A max arron: 122.7 cm/sec  MV E/A: 0.63  MV max P.9 mmHg  MV mean P.6 mmHg  MV V2  VTI: 37.2 cm  MV dec slope: 246.1 cm/sec2  MV dec time: 0.31 sec  Ao V2 max: 165.1 cm/sec  Ao max P.0 mmHg  Ao V2 mean: 118.5 cm/sec  Ao mean P.2 mmHg  Ao V2 VTI: 32.5 cm  SOLANGE(V,D): 2.8 cm2  AI P1/2t: 454.3 msec  LV V1 max P.4 mmHg  LV V1 max: 153.6 cm/sec  PA V2 max: 102.6 cm/sec  PA max P.2 mmHg  PA acc time: 0.08 sec  AV Ashutosh Ratio (DI): 0.93  Lateral E/e': 9.2     ______________________________________________________________________________  Report approved by: Cristóbal Horowitz 12/15/2022 11:31 AM           Medications     heparin Stopped (12/15/22 1400)     - MEDICATION INSTRUCTIONS -       sodium chloride 75 mL/hr at 12/15/22 1238       buPROPion  300 mg Oral QAM     carbidopa-levodopa  1 tablet Oral At Bedtime     carbidopa-levodopa  2 tablet Oral 4x Daily     citalopram  40 mg Oral Daily     diltiazem ER COATED BEADS  180 mg Oral Daily     donepezil  10 mg Oral At Bedtime     fluticasone-vilanterol  1 puff Inhalation Daily     insulin aspart  1-7 Units Subcutaneous TID AC     insulin aspart  1-5 Units Subcutaneous At Bedtime     isosorbide mononitrate  30 mg Oral Daily     levothyroxine  88 mcg Oral QAM AC     naltrexone  4 mg Oral At Bedtime     pantoprazole  40 mg Intravenous BID     potassium chloride  20 mEq Oral or Feeding Tube Once     rosuvastatin  5 mg Oral Daily     sodium chloride (PF)  3 mL Intracatheter Q8H     spironolactone  25 mg Oral Daily     topiramate  50 mg Oral TID     traZODone   mg Oral At Bedtime

## 2022-12-15 NOTE — PROGRESS NOTES
CHINA Cordon 259 - FYI pt lower ex ultrasound came back positive for Nonocclusive DVT in the left popliteal, posterior tibial, and peroneal veins. right leg negative for DVT, please advise accordingly. YAMINI Celaya * [phone]    Webpaged Adair Ceron @ [phone]

## 2022-12-15 NOTE — PHARMACY-ADMISSION MEDICATION HISTORY
Pharmacy Medication History  Admission medication history interview status for the 12/14/2022  admission is complete. See EPIC admission navigator for prior to admission medications     Location of Interview: Patient room  Medication history sources: Patient, Patient's family/friend (), Surescripts and Care Everywhere    Significant changes made to the medication list:  - Removed: Alprazolam, fluocinonide solution, ibuprofen, levalbuterol,iron, vit B6, vit B12 (per pt and  no longer takes)  - Added albuterol, carbidopa-levodopa, donepezil, hydroxyzine, isosorbide mononitrate, naproxen, rivastigmine (per patient/, surscripts and most recent hospitalization)    In the past week, patient estimated taking medication this percent of the time: less than 50% due to illness    Additional medication history information:   This was quite a difficult med rec due to multiple information sources with differing information.     Per patient and , pt has not been able to hold down meds for the past 8 weeks due to ongoing nausea problem. The only two medications the patient has been taking consistently are sinemet and pantoprazole, per patient and , all other medications have more or less been stopped/sparingly taken.     Pt reports not taking any insulin in the past two weeks possibly month d/t not eating.     Pt reports last apixaban dose sometime in the past week, but again has not been taking regularly d/t nausea.    Did best between refill history, last hospital admission, various notes and discussion with patient, however uncertain that this is a completely accurate list.      appears to know a little more about medications but conversation was continually side tracked by pt- talking, difficult to     Medication reconciliation completed by provider prior to medication history? No    Time spent in this activity: 90 minutes    Prior to Admission medications    Medication Sig Last  Dose Taking? Auth Provider Long Term End Date   acetaminophen (TYLENOL) 32 mg/mL liquid Take 160 mg by mouth every 4 hours as needed for fever or mild pain prn at prn Yes Unknown, Entered By History     albuterol (PROAIR HFA/PROVENTIL HFA/VENTOLIN HFA) 108 (90 Base) MCG/ACT inhaler Inhale 2 puffs into the lungs every 6 hours as needed for shortness of breath, wheezing or cough prn at prn Yes Unknown, Entered By History Yes    apixaban ANTICOAGULANT (ELIQUIS) 5 MG tablet TAKE 1 TABLET BY MOUTH TWICE DAILY Past Week Yes Reported, Patient     budesonide-formoterol (SYMBICORT) 160-4.5 MCG/ACT Inhaler INHALE 2 PUFFS BY MOUTH TWICE DAILY. RINSE MOUTH/ GARGLE AFTER USE Past Month Yes Reported, Patient Yes    bumetanide (BUMEX) 1 MG tablet Take 2 mg by mouth daily More than a month Yes Unknown, Entered By History Yes    buPROPion (WELLBUTRIN XL) 150 MG 24 hr tablet Take 150 mg by mouth More than a month Yes Reported, Patient Yes    buPROPion (WELLBUTRIN XL) 300 MG 24 hr tablet Take 300 mg by mouth More than a month Yes Reported, Patient Yes    carbidopa-levodopa (SINEMET CR)  MG CR tablet Take 1 tablet by mouth At Bedtime 12/13/2022 Yes Unknown, Entered By History Yes    carbidopa-levodopa (SINEMET)  MG tablet Take 2 tablets by mouth 4 times daily 12/14/2022 at am Yes Unknown, Entered By History Yes    citalopram (CELEXA) 20 MG tablet Take 20 mg by mouth Past Month Yes Reported, Patient Yes    diltiazem ER (DILT-XR) 180 MG 24 hr capsule Take 180 mg by mouth Past Month Yes Reported, Patient Yes    donepezil (ARICEPT) 10 MG tablet Take 10 mg by mouth At Bedtime Past Month Yes Unknown, Entered By History     fluticasone (FLONASE) 50 MCG/ACT nasal spray SHAKE LIQUID AND USE 2 SPRAYS IN EACH NOSTRIL DAILY Past Week Yes Reported, Patient     hydrOXYzine (ATARAX) 10 MG tablet Take 10 mg by mouth 3 times daily as needed for itching  Yes Unknown, Entered By History     insulin glargine (LANTUS PEN) 100 UNIT/ML pen Inject  28 Units Subcutaneous At Bedtime Past Month Yes Reported, Patient Yes    insulin lispro (HUMALOG KWIKPEN) 100 UNIT/ML (1 unit dial) KWIKPEN 18-20 units AM, 4-5 UNITS before LUNCH, 10-12 UNITS before DINNER + SS 1/50>150.  TDD 40-50 units. Past Month Yes Reported, Patient Yes    isosorbide mononitrate (IMDUR) 30 MG 24 hr tablet Take 30 mg by mouth daily Past Month Yes Unknown, Entered By History Yes    levothyroxine (SYNTHROID/LEVOTHROID) 88 MCG tablet Take 88 mcg by mouth Past Month Yes Reported, Patient Yes    naltrexone (REVIA) 1 mg/mL SOLN Take 4 mg by mouth Past Month Yes Reported, Patient Yes    nystatin (MYCOSTATIN) 808548 UNIT/GM external cream Apply topically daily as needed Past Month Yes Reported, Patient     ondansetron (ZOFRAN) 4 MG tablet Take by mouth every 8 hours as needed for nausea  Yes Unknown, Entered By History     oxyCODONE (ROXICODONE) 5 MG tablet Take 2.5-5 mg by mouth every 6 hours as needed Past Month Yes Reported, Patient     pantoprazole (PROTONIX) 20 MG EC tablet Take 40 mg by mouth 2 times daily (with meals) 12/14/2022 at am Yes Unknown, Entered By History     rosuvastatin (CRESTOR) 5 MG tablet Take 5 mg by mouth Past Month Yes Reported, Patient Yes 12/14/22   senna-docusate (SENOKOT-S/PERICOLACE) 8.6-50 MG tablet Take 2 tablets by mouth daily as needed  Yes Reported, Patient     spironolactone (ALDACTONE) 25 MG tablet Take 25 mg by mouth Past Month Yes Reported, Patient Yes 12/14/22   topiramate (TOPAMAX) 50 MG tablet Take 50 mg by mouth 3 times daily Past Month Yes Reported, Patient Yes    traZODone (DESYREL) 50 MG tablet Take 50 mg by mouth At Bedtime Past Month Yes Reported, Patient Yes    blood glucose (ACCU-CHEK GUIDE) test strip Use one test strip to monitor blood glucose six times daily.   Reported, Patient     Blood Glucose Monitoring Suppl (ACCU-CHEK GUIDE ME) w/Device KIT Use as directed.   Reported, Patient     Continuous Blood Gluc Sensor (FREESTYLE BETY 2 SENSOR) [facility] [place]  each   Reported, Patient     insulin pen needle (NOVOFINE PLUS) 32G X 4 MM miscellaneous Inject 5 each Subcutaneous   Reported, Patient     Lancets MISC 1 each   Reported, Patient     rivastigmine (EXELON) 4.6 MG/24HR 24 hr patch Place 1 patch onto the skin daily As of 12/14/22 has not started.   Unknown, Entered By History     sodium fluoride dental gel (PREVIDENT) 1.1 % GEL topical gel BRUSH BID QAM AND PM. NO WATER RINSE AFTER   Reported, Patient         The information provided in this note is only as accurate as the sources available at the time of update(s)

## 2022-12-15 NOTE — CONSULTS
Interventional Radiology - Progress Note  Inpatient - [facility]  12/15/2022    IR Note    IR consulted for evaluation for PE thrombectomy in patient with saddle embolus demonstrated on CT and right heart strain. She is on therapeutic heparin IV. Patient remains hemodynamically stable. Case reviewed with Dr Davies. Clot would be amenable to thrombectomy however in discussing procedure with patient she refused intervention. Palliative care is on consult and she is interested in pursuing hospice care after discharge. Recommend continuing medical management with anticoaguation. There is relative indication for IVC filter placement given DVT and likely decreased cardiopulmonary reserve. Will discuss this option with patient in AM.     Will revisit thrombectomy if cardiopulmonary status worsens.    Michele Shi PA-C  Interventional Radiology  [phone] (IR)  [phone] ([place] Office)

## 2022-12-15 NOTE — CONSULTS
"Consult received - Provider Order \"failure to thrive\"  Chart reviewed, plan for palliative to see to discuss goals of care, as pt has expressed some consideration of hospice approach. Will await results of this discussion prior to full nutrition evaluation / recommendations. Thank you for the consult.     Alaina Altamirano RD, LD  [facility] [place], 65, Ortho, [facility]o Spine  Pager: [phone]  Weekend Pager: [phone]    "

## 2022-12-15 NOTE — CONSULTS
Care Management Initial Consult    General Information  Assessment completed with: Patient, Spouse or significant other,    Type of CM/SW Visit: Initial Assessment    Primary Care Provider verified and updated as needed: Yes   Readmission within the last 30 days: no previous admission in last 30 days      Reason for Consult: discharge planning, care coordination/care conference, end of life/hospice  Advance Care Planning: Advance Care Planning Reviewed: other (see comments) (No docs)          Communication Assessment  Patient's communication style: spoken language (English or Bilingual)    Hearing Difficulty or Deaf: no        Cognitive  Cognitive/Neuro/Behavioral: .WDL except  Level of Consciousness: alert  Arousal Level: arouses to voice  Orientation: disoriented to, situation  Mood/Behavior: cooperative  Best Language: 0 - No aphasia  Speech: slow, clear    Living Environment:   People in home: spouse     Current living Arrangements: house      Able to return to prior arrangements:         Family/Social Support:  Care provided by: spouse/significant other  Provides care for: no one, unable/limited ability to care for self  Marital Status:   , Other (specify) (friends, therapist, psychic)  Wiliam       Description of Support System: Involved, Supportive    Support Assessment: Adequate family and caregiver support    Current Resources:   Patient receiving home care services: Yes  Skilled Home Care Services: Home Health Aid (A home health aide comes to  house twice a week)  Community Resources:  (Pt is on CADI waiver)  Equipment currently used at home: commode chair  Supplies currently used at home:      Employment/Financial:  Employment Status: retired        Financial Concerns:             Lifestyle & Psychosocial Needs:  Social Determinants of Health     Tobacco Use: Not on file   Alcohol Use: Not on file   Financial Resource Strain: Not on file   Food Insecurity: Not on file    Transportation Needs: Not on file   Physical Activity: Not on file   Stress: Not on file   Social Connections: Not on file   Intimate Partner Violence: Not on file   Depression: Not on file   Housing Stability: Not on file       Functional Status:  Prior to admission patient needed assistance:              Mental Health Status:          Chemical Dependency Status:                Values/Beliefs:  Spiritual, Cultural Beliefs, Mosque Practices, Values that affect care: other (see comments) (psychic)               Additional Information:  SW met with pt and her spouse, Wiliam to discuss hospice. SW spoke with pt by herself initially. Pt states that she is not afraid of dying and that she has been speaking with a psychic about her acceptance of death. Pt states that she would like to say goodbye to her loved ones before passing, so she does not want to be on hospice immediately. 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     SW then spoke with Wiliam about pt's plan. Wiliam disclosed he is pt's sole caregiver in additional to the home health aide that comes out. Wiliam displayed non linear thoughts, and concern for pt's care. Wiliam did share pt was on the CADI waiver.     SW spoke with Wiliam and pt together and discussed what hospice looked like at home, SNF, and residential setting. SW also went over what Medicare covers and how [place] would pay for pt's stay at a SNF. Pt and Wiliam report they would like a referral sent o [facility] for LTC placement. Pt has not picked a hospice agency. SW sent referal via [facility]. Pt is leaning towards [facility] for an agency. SW has not sent a referral.    MARICARMEN Wilkerson

## 2022-12-15 NOTE — ED NOTES
[facility]  ED Nurse Handoff Report    ED Chief complaint: Generalized Weakness and Syncope      ED Diagnosis:   Final diagnoses:   None       Code Status: Hospital MD to address     Allergies:   Allergies   Allergen Reactions    Ciprofloxacin Hives and Rash    Erythromycin Hives and Rash    Latex Unknown     Other reaction(s): *Unknown    Penicillins Hives     However she tolerates cephalexin (Kelfex)      Atorvastatin Muscle Pain (Myalgia)     muscle weakness  muscle weakness      Exenatide Diarrhea     Other reaction(s): Gastrointestinal    Liraglutide Nausea and Vomiting     Did not tolerate, even with low dose.     Metformin Diarrhea and Nausea    Oxybutynin Other (See Comments)     Dry mouth, nausea, confusion    Pravastatin      Other reaction(s): Myalgias    Sulfa Drugs Hives       Patient Story: Pt presents from home with  and reports over the past few weeks has been feeling increased weakness. She has parkinson's but states this is worse/different than her parkinson's weakness. She also reported she was unable to eat/drink anything for over 8 weeks because of GI distress and has been trying to follow up with GI. EMS arrived and found pt to be vitally stable but profoundly weak and she reported syncopal episode at home. Troponin elevated and ddimer elevated at 17- CT of chest performed showing large PE. Pt had CT of her scan which was initially concerning for blood but MRI confirmed no blood. Plan to give high dose heparin.  does report she does not take any of her medications as prescribed and only takes some that she wants to take- she is supposed to be taking eliquis but unknown if she is actually taking it  IR consulted but pt reports she would not want IR intervention- states she wants to start talking about hospice  Focused Assessment:  a/o x4, appears weak, vss, full cares at this time     Treatments and/or interventions provided: Blood work, ekg, CT chest, CT head,  MRI  Patient's response to treatments and/or interventions: IVF, heparin    To be done/followed up on inpatient unit:  tolerating well     Does this patient have any cognitive concerns?:  none    Activity level - Baseline/Home:  Stand with Assist  Activity Level - Current:   Total Care    Patient's Preferred language: English   Needed?: No    Isolation: None  Infection: Not Applicable  Patient tested for COVID 19 prior to admission: NO  Bariatric?: No    Vital Signs:   Vitals:    12/14/22 1611 12/14/22 1626 12/14/22 1641 12/14/22 1656   BP:  (!) 159/89  (!) 141/84   Pulse: 86 87 84 85   Resp: 21 18 17 18   Temp:       TempSrc:       SpO2: 96% 94% 95% 96%   Weight:       Height:           Cardiac Rhythm:     Was the PSS-3 completed:   Yes  What interventions are required if any?               Family Comments:  at bedside  OBS brochure/video discussed/provided to patient/family: N/A              Name of person given brochure if not patient: n/a              Relationship to patient: n/a    For the majority of the shift this patient's behavior was Green.   Behavioral interventions performed were none.    ED NURSE PHONE NUMBER: *[phone]     '

## 2022-12-15 NOTE — H&P
[facility]  History and Physical   Hospitalist  Marek Grider MD       Jami Cordon MRN# [id number]   YOB: 1950 Age: 72 year old      Date of Admission:  12/14/2022         Assessment and Plan:   Jami Cordon is a 72 year old female with PMH significant for extensive past medical history including diabetes, hypertension, dyslipidemia, h/o pulmonary embolism, paroxysmal atrial fibrillation (on Eliquis), asthma/COPD, diastolic CHF, CKD stage III, hypothyroidism, GERD, duodenal ulcer, IBS, anxiety/depression, SANDI, Parkinson's disease, overactive bladder who presented to ED with generalized weakness, failure to thrive, dyspnea on exertion; noted with acute saddle PE, admitted 12/14/22.    She has had recent hospitalizations for episodic nausea and vomiting. She was recently admitted from 11/1 to 11/15/22. She was seen by Ariella COOLEY and had EGD on 12/3/22 which was noted with duodenal ulcers. Her Prilosec was changed to Protonix BID. She states she has had continued nausea and vomiting and thus has had very poor PO intake and has not been taking her medications.  is unable to take care of patient at home and even discussed hospice cares with her PCP during recent visit on 12/6/22.    Acute saddle pulmonary embolism with right heart strain  H/o PE (on Eliquis PTA)  elevated troponin  medication noncompliance  -will admit as inpatient to [facility]  -D dimer was elevated at 17.35; hemodynamically stable and denies any chest pain  -CT chest noted with Saddle pulmonary embolism with large amount of thrombus noted in both the right and left main pulmonary arteries extending into the lobar and segmental branches of all lobes. Moderate right heart strain.   -Suspect the pulmonary embolism is likely due to her noncompliance with anticoagulation due to poor PO intake/nausea and vomiting (see discussion below)  -IR was contacted from ED and given her relative hemodynamic stability  IR suggested no acute IR intervention; will put formal IR consult  -patient however is opposed to any aggressive intervention and wants to consider for hospice care  -will get palliative care consulted for goals of care discussion;  for consideration of hospice  -started on heparin drip in ED, will continue  -noted elevated troponin 1093 , likely due to right heart strain ; denies any chest pain ; will follow serial troponin ; monitor on telemetry in IMC  -will obtain lower extremity ultrasound  -will also obtain an ECHO    Nausea/vomiting  duodenal ulcer  poor PO intake  failure to thrive  goals of care  - She has had recent hospitalizations for episodic nausea and vomiting. She was recently admitted from 11/1 to 11/15/22. She was seen by [place] [facility] and had EGD on 12/3/22 which was noted with duodenal ulcers. Her Prilosec was changed to Protonix BID.   - she has had continued nausea and vomiting and thus has had very poor PO intake and has not been taking her medications.  is unable to take care of patient at home and even discussed hospice cares with her PCP during recent visit on 12/6/22.  -will get palliative care consulted for goals of care discussion;  for consideration of hospice  -will start IV Protonix 40 mg BID  -consult [place] [facility]  -PRN antiemetics  -will start NS at 75 ml/hr until taking adequate PO and pending goals of care discussion  -goals of care is restorative, see might need nutrition consult    Diabetes mellitus type II  -PTA on Lantus and NovoLog  -has not been taking her medications due to very poor PO intake  -will hold off on PTA insulin regimen  -will start sliding scale insulin    Hypertension  Dyslipidemia  paroxysmal atrial fibrillation  diastolic CHF  -again as noted patient has not been taking her medications recently  - will resume PTA diltiazem, Aldactone, statins  -hold off on PTA Bumex given poor PO intake and dehydration  -IVF as above;  monitor volume status closely  -will hold off on PTA Eliquis while on Heparin drip as above for saddle PE    Hypothyroidism  -continue PTA Synthroid    Anxiety/depression  Parkinson's disease  -will resume PTA Sinemet, trazodone, Celexa, Wellbutrin, Aricept, Topamax    Asthma/COPD  -respiratory status currently stable despite saddle PE  -resume PTA inhalers including Symbicort    CKD stage III  -BUN 37, creatinine 1.14; clinically dehydrated; monitor BMP with hydration        Clinically Significant Risk Factors Present on Admission         # Hyponatremia: Lowest Na = 134 mmol/L in last 2 days, will monitor as appropriate   # Hypercalcemia: corrected calcium is >10.1, will monitor as appropriate    # Hypoalbuminemia: Lowest albumin = 2.8 g/dL at 12/14/2022  1:40 PM, will monitor as appropriate  # Drug Induced Coagulation Defect: home medication list includes an anticoagulant medication         # Obesity: Estimated body mass index is 35.54 kg/m  as calculated from the following:    Height as of this encounter: 1.524 m (5').    Weight as of this encounter: 82.6 kg (182 lb).              # DVT prophylaxis: on heparin drip as above  # Code status: DNR/DNI    Care plan discussed with ED provider and patient.           Primary Care Physician:   Mary Kay Flannery         Chief Complaint:   generalized weakness, failure to thrive, poor PO intake, nausea and vomiting    History is obtained from the patient and extensive chart review         History of Present Illness:     Jami Cordon is a 72 year old female with PMH significant for extensive past medical history including diabetes, hypertension, dyslipidemia, h/o pulmonary embolism, paroxysmal atrial fibrillation (on Eliquis), asthma/COPD, diastolic CHF, CKD stage III, hypothyroidism, GERD, duodenal ulcer, IBS, anxiety/depression, SANDI, Parkinson's disease, overactive bladder who presented to ED with generalized weakness, failure to thrive, dyspnea on exertion;  noted with acute saddle PE, admitted 12/14/22.    She has had recent hospitalizations for episodic nausea and vomiting. She was recently admitted from 11/1 to 11/15/22. She was seen by Ariella COOLEY and had EGD on 12/3/22 which was noted with duodenal ulcers. Her Prilosec was changed to Protonix BID. She states she has had continued nausea and vomiting and thus has had very poor PO intake and has not been taking her medications.  is unable to take care of patient at home and even discussed hospice cares with her PCP during recent visit on 12/6/22.    See reports feeling very weak for past 1 to 2 weeks, has had very poor PO intake due to continued nausea and vomiting. He is also not been taking her medications for past several weeks. She states she was too weak to stand, denies any chest pain but has been having some dyspnea on exertion.     In ED patient was seen by Dr Ramirez; initial workup was noted with elevated troponin and D dimer; thus a CT chest was obtained which was noted with saddle PE. The provider did discuss with IR who suggested no need for emergent IR intervention due to hemodynamic stability. Furthermore patient does not wish for any aggressive intervention. She was started on heparin drip and hospitalist was requested admission for further evaluation.    The patient denies any fever, chills, rigors, chest pain.  Denies pain abdomen.  No bowel or bladder disturbances. Reports ongoing nausea and vomiting and very poor PO intake.           Past Medical History:     Diabetes mellitus type II  Hypertension  Dyslipidemia  h/o pulmonary embolism  paroxysmal atrial fibrillation (on Eliquis)  Asthma/COPD  diastolic CHF  CKD stage III  Hypothyroidism  GERD  duodenal ulcer  IBS  Anxiety/depression  SANDI  Parkinson's disease  overactive bladder          Past Surgical History:   No past surgical history on file.           Home Medications:     Prior to Admission Medications   Prescriptions Last Dose  Informant Patient Reported? Taking?   ALPRAZolam (XANAX) 0.25 MG tablet   Yes No   Sig: Take 0.25 mg by mouth   Blood Glucose Monitoring Suppl (ACCU-CHEK GUIDE ME) w/Device KIT   Yes No   Sig: Use as directed.   Continuous Blood Gluc Sensor (FREESTYLE BETY 2 SENSOR) MISC   Yes No   Si each   Iron-Vitamin C (IRON 100/C) 100-250 MG TABS   Yes No   Si-2 tablets   Lancets MISC   Yes No   Si each   acetaminophen (TYLENOL) 500 MG tablet   Yes No   Sig: Take 1,000 mg by mouth   apixaban ANTICOAGULANT (ELIQUIS ANTICOAGULANT) 5 MG tablet   Yes No   Sig: TAKE 1 TABLET BY MOUTH TWICE DAILY   blood glucose (ACCU-CHEK GUIDE) test strip   Yes No   Sig: Use one test strip to monitor blood glucose six times daily.   buPROPion (WELLBUTRIN SR) 150 MG 12 hr tablet   Yes No   Sig: Take 150 mg by mouth   buPROPion (WELLBUTRIN XL) 150 MG 24 hr tablet   Yes No   Sig: Take 150 mg by mouth   buPROPion (WELLBUTRIN XL) 300 MG 24 hr tablet   Yes No   Sig: Take 300 mg by mouth   budesonide-formoterol (SYMBICORT) 160-4.5 MCG/ACT Inhaler   Yes No   Sig: INHALE 2 PUFFS BY MOUTH TWICE DAILY. RINSE MOUTH/ GARGLE AFTER USE   citalopram (CELEXA) 20 MG tablet   Yes No   Sig: Take 20 mg by mouth   diltiazem ER (DILT-XR) 180 MG 24 hr capsule   Yes No   Sig: Take 180 mg by mouth   fluconazole (DIFLUCAN) 150 MG tablet   Yes No   Sig: if vaginal yeast symptoms take bhatti  for 3 days, then once a week till script is gone   fluocinonide (LIDEX) 0.05 % external solution   Yes No   Sig: Apply 10 drops to scalp daily as needed   fluticasone (FLONASE) 50 MCG/ACT nasal spray   Yes No   Sig: SHAKE LIQUID AND USE 2 SPRAYS IN EACH NOSTRIL DAILY   furosemide (LASIX) 20 MG tablet   Yes No   Sig: Take 40 mg by mouth   ibuprofen (ADVIL/MOTRIN) 200 MG tablet   Yes No   Sig: Take 200 mg by mouth   insulin glargine (LANTUS SOLOSTAR) 100 UNIT/ML pen   Yes No   Sig: Inject 22 Units Subcutaneous   insulin lispro (HUMALOG KWIKPEN) 100 UNIT/ML (1 unit dial) KWIKPEN    Yes No   Si-20 units AM, 4-5 UNITS before LUNCH, 10-12 UNITS before DINNER + SS >150.  TDD 40-50 units.   insulin pen needle (NOVOFINE PLUS) 32G X 4 MM miscellaneous   Yes No   Sig: Inject 5 each Subcutaneous   levalbuterol (XOPENEX HFA) 45 MCG/ACT inhaler   Yes No   Sig: Inhale 1-2 puffs into the lungs   levothyroxine (SYNTHROID/LEVOTHROID) 88 MCG tablet   Yes No   Sig: Take 88 mcg by mouth   naltrexone (REVIA) 1 mg/mL SOLN   Yes No   Sig: Take 4 mg by mouth   norethindrone (AYGESTIN) 5 MG tablet   Yes No   Sig: TAKE 1 TABLET BY MOUTH DAILY FOR 10 DAYS IN A ROW AS DIRECTED EVERY 3 MONTHS   nystatin (MYCOSTATIN) 375081 UNIT/GM external cream   Yes No   nystatin (MYCOSTATIN) 172557 UNIT/ML suspension   Yes No   Si mLs   omeprazole (PRILOSEC) 40 MG DR capsule   Yes No   Sig: TAKE 1 CAPSULE BY MOUTH TWICE DAILY   oxyCODONE (ROXICODONE) 5 MG tablet   Yes No   Sig: Take 2.5-5 mg by mouth   pravastatin (PRAVACHOL) 40 MG tablet   Yes No   Sig: Take 40 mg by mouth   pyridOXINE (VITAMIN B-6) 50 MG tablet   Yes No   Si tablet   rosuvastatin (CRESTOR) 5 MG tablet   Yes No   Sig: Take 5 mg by mouth   senna-docusate (SENOKOT-S/PERICOLACE) 8.6-50 MG tablet   Yes No   Sig: Take 2 tablets by mouth   sodium fluoride dental gel (PREVIDENT) 1.1 % GEL topical gel   Yes No   Sig: BRUSH BID QAM AND PM. NO WATER RINSE AFTER   spironolactone (ALDACTONE) 25 MG tablet   Yes No   Sig: Take 25 mg by mouth   topiramate (TOPAMAX) 50 MG tablet   Yes No   Sig: Take 50 mg by mouth   traZODone (DESYREL) 50 MG tablet   Yes No   Sig: Take 50 mg by mouth   triamcinolone (KENALOG) 0.1 % external cream   Yes No   vitamin B-12 (CYANOCOBALAMIN) 1000 MCG CR tablet   Yes No   Si tablet      Facility-Administered Medications: None            Allergies:     Allergies   Allergen Reactions     Ciprofloxacin Hives and Rash     Erythromycin Hives and Rash     Latex Unknown     Other reaction(s): *Unknown     Penicillins Hives     However she  tolerates cephalexin (Kelfex)       Atorvastatin Muscle Pain (Myalgia)     muscle weakness  muscle weakness       Exenatide Diarrhea     Other reaction(s): Gastrointestinal     Liraglutide Nausea and Vomiting     Did not tolerate, even with low dose.      Metformin Diarrhea and Nausea     Oxybutynin Other (See Comments)     Dry mouth, nausea, confusion     Pravastatin      Other reaction(s): Myalgias     Sulfa Drugs Hives            Social History:   Jami Cordon  reports that she has never smoked. She has never used smokeless tobacco.              Family History:   Jami Cordon family history is not on file.    Family history was reviewed by myself and not pertinent to current presentation.           Review of Systems:   A10 point Review of Systems was done and were negative other than noted in the HPI.             Physical Exam:   Blood pressure 139/63, pulse 84, temperature 97.3  F (36.3  C), temperature source Temporal, resp. rate 16, height 1.524 m (5'), weight 82.6 kg (182 lb), SpO2 98 %.  182 lbs 0 oz        Constitutional: Alert, awake and oriented X 3; lying comfortably in bed in no apparent distress   HEENT: Pupils equal and reactive to light and accomodation, EOMI intact; neck supple no raised JVD or rigidity    Oral cavity:  dry oral mucosa    Cardiovascular: Normal s1 s2, regular rate and rhythm, no murmur   Lungs: B/l clear to auscultation, no wheezes or crepitations   Abdomen: Soft, nt, nd, no guarding, rigidity or rebound; BS +   LE : No edema; b/l calf tenderness +   Musculoskeletal: Power 5/5 in all extremities   Neuro: No focal neurological deficits noted, CN II to XII grossly intact   Psychiatry: normal mood and affect  Skin: No obvious skin rashes or ulcers             Data:   All new lab and imaging data was reviewed in Epic.   Significant labs and imagings include:    CMP notable for BUN 37, creatinine 1.14, albumin 2.8, normal transaminases, alkaline phosphatase 173  blood glucose  274  BNP 9445  troponin 1093  normal TSH  unremarkable CBC  EKG normal sinus rhythm, no acute ST-T changes  CT chest PE protocol:  IMPRESSION:  1.  Saddle pulmonary embolism with large amount of thrombus noted in both the right and left main pulmonary arteries extending into the lobar and segmental branches of all lobes. Moderate right heart strain. Recommend consultation with interventional   radiology for further evaluation and management.    MRI Brain:  IMPRESSION:  1.  Small left parietal cavernous malformation without surrounding edema or findings of recent hemorrhage. This corresponds to the finding on recent CT.  2.  Otherwise, age-related changes without acute intracranial abnormality.               Marek Grider MD  Hospitalist

## 2022-12-15 NOTE — CONSULTS
[facility]  Palliative Care Consultation Note    Patient: Jami Cordon  Date of Admission:  12/14/2022    Requesting Clinician / Team: Dr. Grider/Hospitalist   Reason for consult: Goals of care    Recommendations:    Pt would like to continue her current level of care, but does not want to escalate her cares to ICU, affirms desire for DNR/DNI    She is ok with pursuing further treatments, basic work up as needed to stabilize her current condition, yet her general goal of care is comfort     She recognizes that her health is deteriorating quickly, and this has been a lot to process in a short period of time. She wants to take time to process and talk to her loved ones about her declining health/end of life cares     Pt is interested in pursuing hospice following this hospitalization. Will consult SW for hospice planning. I anticipate that she may need facility placement for hospice      Will ask spiritual health to help connect her with her rabbi, per pt request      Will ask palliative LICSW, Radha Scott, to check in for emotional support in setting of physical decline and struggling with processing this decline     Pt states that N/V is resolved at this time     These recommendations have been discussed with unit SW via voicemail.    VERO Vega [facility]  Contact information available via [facility] Paging/Directory      Thank you for the opportunity to participate in the care of this patient and family. Our team: will continue to follow.     During regular M-F work hours ([phone]) -- if you are not sure who specifically to contact -- please contact us on [facility] Smart Web.     After regular work hours and on weekends/holidays, you can call our answering service at [phone].     Attestation:  Total time on the floor involved in the patient's care: 70 minutes  Total time spent in counseling/care coordination: >50% spent in counseling goals of  care in setting of hx PE, paroxysmal a.fib, asthma/COPD, diastolic CHF, CKD, duodenal ulcer, anxiety, depression, parkinson's disease, intractable nausea and vomiting, generalized weakness, failure to thrive, WHYTE, acute saddle PE with right heart strain    Assessments:  Jami Cordon is a 72 year old female with PMH significant for diabetes, hypertension, dyslipidemia, pulmonary embolism, paroxysmal atrial fibrillation (on Eliquis, yet with medication noncompliance related to recent N/V), asthma/COPD, diastolic CHF, CKD stage III, hypothyroidism, GERD, duodenal ulcer, IBS, anxiety/depression, SANDI, Parkinson's disease, overactive bladder, and recent hospitalization 11/1-11/15 for intractable N/V s/p EGD and found to have duodenal ulcers who presents with generalized weakness, failure to thrive, ongoing intractable N/V, and dyspnea on exertion. She is noted to have an acute saddle PE with right heart strain. She has been initiated on a heparin gtt.     Today, the patient was seen for:  Goals of care in setting of hx PE, paroxysmal a.fib, asthma/COPD, diastolic CHF, CKD, duodenal ulcer, anxiety, depression, parkinson's disease, intractable nausea and vomiting, generalized weakness, failure to thrive, WHYTE, acute saddle PE with right heart strain    Visited with Jami. Introduced myself and our services; assistance in pain and symptom management, emotional and spiritual support, and complex medical decision making.    Pt has been on and off the phone with various family members this AM; her sister and cousin. She doesn't have her cell phone ( brought it home), so this is frustrating for her.     Jami believes she is in the hospital for a big heart attack. I clarify that she has a new PE, which is causing stress on her heart.     She believes she is going downhill quickly and is struggling to process this in such a short period of time.     She affirms her desire for limiting/avoiding medical interventions  "to stop her dying process. She wants to be comfortable. She affirms her request for DNR/DNI.    We talk about folding in the support of hospice. Educated patient regarding hospice philosophy and prognostic criteria. Dispelled common myths. Discussed what hospice is (and is not), what services are usually provided (and those that are not, ex \"[facility] care\"), under what circumstances people tend to enroll, and the variety of places people can get hospice care.    She is interested in pursuing hospice.     Yet she still needs to process her decline and to talk further with loved ones about her end of life cares.     She thus requests to continue her current level of care, but does not want to escalate her cares to ICU. She is ok with pursuing further treatments, basic work up as needed to stabilize her current condition, yet her general goal of care is comfort.     Prognosis, Goals, & Planning:      Functional Status just prior to hospitalization: 3 (Capable of only limited self-care; needs help with ADLs; in bed/chair >50% of waking hours)      Prognosis, Goals, and/or Advance Care Planning were addressed today: Yes      Patient's decision making preferences: independently          Patient has decision-making capacity today for complex decisions: Yes            I have concerns about the patient/family's health literacy today: No           Patient has a completed Health Care Directive: No.       Code status: No CPR / No Intubation    Coping, Meaning, & Spirituality:   Mood, coping, and/or meaning in the context of serious illness were addressed today: Yes  Summary/Comments: Pt is Jehovah's witness and would like the support of her lmbi.     Social:     Living situation: Lives with  in [place]; he is having difficulty caring for her at home with her decline    Key family / caregivers: . She had a son who . Supportive sister, cousin in [place], longstanding therapist rabbi Cammie    History of Present " Illness:  History gathered today from: patient, medical chart, medical team members, unit team members    Jami Cordon is a 72 year old female with PMH significant for diabetes, hypertension, dyslipidemia, pulmonary embolism, paroxysmal atrial fibrillation (on Eliquis, yet with medication noncompliance related to recent N/V), asthma/COPD, diastolic CHF, CKD stage III, hypothyroidism, GERD, duodenal ulcer, IBS, anxiety/depression, SANDI, Parkinson's disease, overactive bladder, and recent hospitalization 11/1-11/15 for intractable N/V s/p EGD and found to have duodenal ulcers who presents with generalized weakness, failure to thrive, ongoing intractable N/V, and dyspnea on exertion. She is noted to have an acute saddle PE with right heart strain. She has been initiated on a heparin gtt.     Key Palliative Symptom Data:  # Nausea severity the last 12 hours: none    Patient is on opioids: bowels not assessed today.    ROS:  Comprehensive ROS is reviewed and is negative except as here & per HPI: N/A     Past Medical History:  No past medical history on file.     Past Surgical History:  No past surgical history on file.      Family History:  No family history on file.      Allergies:  Allergies   Allergen Reactions     Ciprofloxacin Hives and Rash     Erythromycin Hives and Rash     Latex Unknown     Other reaction(s): *Unknown     Penicillins Hives     However she tolerates cephalexin (Kelfex)       Atorvastatin Muscle Pain (Myalgia)     muscle weakness  muscle weakness       Exenatide Diarrhea     Other reaction(s): Gastrointestinal     Liraglutide Nausea and Vomiting     Did not tolerate, even with low dose.      Metformin Diarrhea and Nausea     Oxybutynin Other (See Comments)     Dry mouth, nausea, confusion     Pravastatin      Other reaction(s): Myalgias     Sulfa Drugs Hives        Medications:  I have reviewed this patient's medication profile and medications from this hospitalization.   Noted scheduled meds  are:  Wellbutrin   Sinemet   Celexa   Diltiazem   Aricept   Insulin   Imdur   Synthroid  Naltrexone   PPI  Crestor   Spironolactone   Topamax   Trazodone 50-100mg PO at bedtime   Heparin gtt  IVF    Noted PRN meds are:  N/A    Physical Exam:  Vital Signs: Temp: 97.8  F (36.6  C) Temp src: Axillary BP: (!) 138/96 Pulse: 80   Resp: 18 SpO2: 96 % O2 Device: None (Room air)    Weight: 167 lbs 8.79 oz  CONSTITUTIONAL: Chronically ill woman seen resting in bed in NAD, A&Ox3. Calm and cooperative.  HEENT: NCAT  RESPIRATORY: NL respiratory effort on RA  NEUROLOGIC: Appropriately responsive during interview  PSYCH: Affect engaged     Data reviewed:  Recent imaging reviewed, my comments on pertinents:   Results for orders placed or performed during the hospital encounter of 12/14/22   CT Head w/o Contrast    Impression    IMPRESSION:  1. Subtle irregular small focus of hyperattenuation involving the  subcortical white matter of the left parietal lobe, nonspecific. This  could possibly represent a vascular lesion such as a cavernous  malformation, but a subtle focus of recent hemorrhage or small  intra-axial mass cannot be excluded. Recommend MRI of the brain  without and with contrast for further evaluation.  2. 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  3. Generalized brain atrophy and presumed chronic small vessel  ischemic change, as described.  4. Mild to moderate supratentorial ventriculomegaly. This is favored  to be due to ex vacuo dilatation in the setting of central white  matter volume loss and brain atrophy. A superimposed component of  communicating hydrocephalus is less likely, but not entirely excluded.    PEDRO BYERS MD         SYSTEM ID:  [id number]   MR Brain w/o & w Contrast    Impression    IMPRESSION:  1.  Small left parietal cavernous malformation without surrounding edema or findings of recent hemorrhage. This corresponds to the finding on recent CT.  2.  Otherwise, age-related changes without acute  intracranial abnormality.   CT Chest Pulmonary Embolism w Contrast   Result Value Ref Range    Radiologist flags (AA)      Sagittal embolism with large pulmonary embolism noted bilaterally with moderate right heart strain.    Impression    IMPRESSION:  1.  Saddle pulmonary embolism with large amount of thrombus noted in both the right and left main pulmonary arteries extending into the lobar and segmental branches of all lobes. Moderate right heart strain. Recommend consultation with interventional   radiology for further evaluation and management.      [Critical Result: Sagittal embolism with large pulmonary embolism noted bilaterally with moderate right heart strain.]    Finding was identified on 12/14/2022 6:32 PM.     Dr. Ramirez was contacted by me on 12/14/2022 6:38 PM and verbalized understanding of the critical result.      US Lower Extremity Venous Duplex Bilateral    Impression    IMPRESSION:  1.  LEFT LEG: Positive for nonocclusive DVT in the popliteal, posterior tibial, and peroneal veins.  2.  RIGHT LEG: Negative for DVT.    I discussed the findings with the patient's nurse, Belia, at 11:30 PM on 12/14/2022.   Echocardiogram Complete   Result Value Ref Range    LVEF  65-70%        Recent lab data reviewed, my comments on pertinents:   Na 142  K 3  Creat 0.89  WBC 9.5  Hgb 13.2  Plt 174  Albumin 2.8  INR 1.28

## 2022-12-16 NOTE — PROGRESS NOTES
states pt's POA is Carmen Viktoriya #[phone] - pt confirmed. Unable to find this in the chart. Writer asked  to bring in legal document showing this so we are able to upload to chart.

## 2022-12-16 NOTE — PROGRESS NOTES
Care Management Follow Up    Length of Stay (days): 2    Expected Discharge Date: 12/16/2022     Concerns to be Addressed:       Patient plan of care discussed at interdisciplinary rounds: Yes    Anticipated Discharge Disposition: Inpatient Chemical Dependency, Long Term Care     Anticipated Discharge Services:    Anticipated Discharge DME:      Patient/family educated on Medicare website which has current facility and service quality ratings:    Education Provided on the Discharge Plan:    Patient/Family in Agreement with the Plan: yes    Referrals Placed by CM/SW:    Private pay costs discussed: Not applicable    Additional Information:  SW spoke with pt and she reports she would like a referral sent to [facility] for a hospice agency. Rubi would like her friend/therapist, Cammie Gonzales called at [phone] and Carmen Sadler, friend at [phone]. SW was able to speak with Carmen on the phone and update her regarding the plan for discharge. SW will also call pt's  with plan. SW called [facility] to inform them of the hospice referral. SW to call Cammie and pt's , Wiliam to update them on hospice. Wiliam's voicemail box is full, so SW could not leave a message. JEFFERY did get ahold of Cammie. JEFFERY faxed the hospice referral to [phone]. SW messaged liaison regarding placement.       SW [place] [facility] [place]. SW was informed that there is no one in admissions this weekend and that they have hit their threshold for admissions. SW will need another choice for placement. SW attempted to speak with pt vis the phone. Pt did not answer, so SW called the nurse to move the phone closer to the pt. Pt was receiving a bath and did not talk to anyone. SW will speak with her in a little bit to discuss placement concerns.     JEFFERY spoke with pt over the phone and informed her no one from [facility] could review her referral until 12/19. SW inquired for different locations to send referrals to. Pt reports  that her friends live in [facility] [place] [facility] [place]. Pt requests no referral goes to [facility] because she had a bad experience there. Additional referrals sent via DOD.    MARICARMEN Wilkerson

## 2022-12-16 NOTE — PROGRESS NOTES
Interventional Radiology - Progress Note  Inpatient - [facility]  12/16/2022    IR Brief Note    I spoke with patient today and she continues to refuse procedural cares. IVC filter placement was offered however patient again reiterated her refusal of procedures. Recommend continuing anticoagulation. Patient expressed wishes to start hospice care at discharge. Discussed with hospitalist and attending IR Dr Alvarado who are in agreement with plan.    Thank you for allowing us to participate in this patient's care. IR will no longer follow. Please contact our service with any questions, concerns, or requests for further intervention.    Michele Shi PA-C  Interventional Radiology  [phone] (IR)  *[phone] ([place] Office)

## 2022-12-16 NOTE — PROGRESS NOTES
12/16/22 9983   Appointment Info   Signing Clinician's Name / Credentials (PT) Ashly Urias, PT, DPT   Living Environment   People in Home spouse   Living Environment Comments Pt does not provide much information on home set up except that she hasn't been able to get out of bed in weeks.   Self-Care   Usual Activity Tolerance fair   Current Activity Tolerance poor   Regular Exercise No   Equipment Currently Used at Home commode chair;walker, rolling   Fall history within last six months no   Activity/Exercise/Self-Care Comment Pt has been total assist at home with declining level of functional mobility. Pt reports she has not been able to  weeks, uses briefs at home and is changed as needed since she can't get out of bed. Spouse or home health aide assists with all ADLs.   General Information   Onset of Illness/Injury or Date of Surgery 12/14/22   Referring Physician Marek Grider MD   Patient/Family Therapy Goals Statement (PT) Pt appears unclear on what her PT goals would be, states it would be nice to be able to stand and pivot to commode then stating she hasn't stood in weeks.   Pertinent History of Current Problem (include personal factors and/or comorbidities that impact the POC) Pt is 72 year old female adm on 12/14/22 via EMS with 1-2 weeks of worsening weakness. Pt found to have large saddle PE with acute cor pulmonale. Pt not wanting any aggressive treatment at this time. Pt also found to have L LE DVT. PMH includes Parkinson's disease, diabetes, hypertension, dyslipidemia, paroxysmal a-fib, recently hospitalized 11/1-11/15/22 due to nausea and vomiting, poor oral intake. Pt has had multiple recent hospitalizations.   Existing Precautions/Restrictions fall   Cognition   Affect/Mental Status (Cognition) WFL   Orientation Status (Cognition) oriented to;person;place   Follows Commands (Cognition) WFL   Pain Assessment   Patient Currently in Pain No   Posture    Posture Forward head  position;Protracted shoulders   Range of Motion (ROM)   ROM Comment B LE ROM WFL, rigidity throughout   Strength (Manual Muscle Testing)   Strength Comments Pt is very deconditioned, can initiate movements in LEs but cannot move through ROM against gravity   Bed Mobility   Comment, (Bed Mobility) Max assist   Transfers   Comment, (Transfers) Recommend use of ceiling lift at this time   Clinical Impression   Criteria for Skilled Therapeutic Intervention Evaluation only   Clinical Presentation (PT Evaluation Complexity) Stable/Uncomplicated   Clinical Presentation Rationale Current presentation, [facility]   Clinical Decision Making (Complexity) low complexity   Risk & Benefits of therapy have been explained evaluation/treatment results reviewed;care plan/treatment goals reviewed;risks/benefits reviewed;current/potential barriers reviewed;participants voiced agreement with care plan;participants included;patient   Clinical Impression Comments Pt currently declining participating in OOB activity, is very deconditioned when performing exercises in bed and attempting bed mobility. Recommend use of ceiling lift for transfers OOB if pt wanting to do this. Will defer any further PT intervention at this time as pt is planning to discharge to facility on hospice. Pt will need total care at facility. Pt cannot return home at this time due to level of care needed.   PT Total Evaluation Time   PT Eval, Low Complexity Minutes (31762) 15   PT Discharge Planning   PT Discharge Recommendation (DC Rec) Long term care facility   PT Rationale for DC Rec Pt's current care needs cannot be met in home environment, needs lift for transfers and total assist for all cares, plans to discharge on hospice, does not want aggressive treatment.   PT Brief overview of current status Total jenni/use of ceiling lift   Total Session Time   Total Session Time (sum of timed and untimed services) 15

## 2022-12-16 NOTE — PROGRESS NOTES
DATE:  12/16/2022   TIME OF RECEIPT FROM LAB:  0924  LAB TEST:  aPTT  LAB VALUE:  173  RESULTS GIVEN WITH READ-BACK TO RN: Franca

## 2022-12-16 NOTE — PROGRESS NOTES
Occupational Therapy: Orders received. Chart reviewed and discussed with care team.?Pt is 72 year old female adm on 12/14/22 via EMS with 1-2 weeks of worsening weakness. Pt found to have large saddle PE with acute cor pulmonale. Spoke with PT, pt has A with ADL's at baseline and does not require OT. Defer discharge recommendations to PT.? OT orders completed.

## 2022-12-16 NOTE — PLAN OF CARE
Disoriented to time and situation, requires extra time to process information. Denies CP or SOB. Tele remains SR, VSS on RA. Refused IVC filter. Heparin discontinued, lovenox started. Low urine output, poor intake as well. Refusing lift to chair. Bladder scan for 125 mL. Pt will discharge on eliquis. Plan for discharge to LTC facility then transition to hospice.

## 2022-12-16 NOTE — PROGRESS NOTES
[facility]    Medicine Progress Note - Hospitalist Service    Date of Admission:  12/14/2022    Assessment & Plan      Jami Cordon is a 72 year old female with diabetes, hypertension, dyslipidemia, h/o pulmonary embolism, paroxysmal atrial fibrillation, on Eliquis, asthma/COPD, diastolic CHF, CKD III, hypothyroidism, GERD, duodenal ulcer, IBS, anxiety/depression, SANDI, Parkinson'sdisease, overactive bladder, who presented to ED on 12/14/2022 with generalized weakness, failure to thrive, dyspnea on exertion.  She was found to have acute saddle pulmonary embolism with right heart strain.     She had not been taking Eliquis at home due to nausea and vomiting.  Patient declined thrombectomy and IVC filter placement.  She declined all invasive procedures.  She was treated initially with IV heparin infusion and subsequently transitioned to subcu Lovenox with plan to resume Eliquis at discharge.     She has had recent hospitalizations for nausea and vomiting. She was admitted from 11/1 to 11/15/22 due to nausea and vomiting.  Underwent EGD on 12/3/2022 by [facility] and found to have duodenal ulcers.  Was switched from omeprazole to Protonix twice daily.  Continues to have nausea and vomiting and poor oral intake.  Because of nausea and vomiting, she had not been taking her medications.      is unable to take care of patient at home.  Palliative care consulted.  Patient interested in comfort focused treatment and plans to enroll in hospice after discharge.       Acute saddle pulmonary embolism with right heart strain  H/o PE (on Eliquis PTA)  NSTEMI due to type II MI-secondary to acute PE with right heart strain  Nonocclusive DVT in left popliteal, posterior, tibial and peroneal veins  Medication noncompliance  - D dimer elevated at 17.35; hemodynamically stable and no chest pain.  Not hypoxic  - CT chest 12/14- showed - Saddle PE with large amount of thrombus in right and left main pulmonary  arteries extending into the lobar and segmental branches of all lobes. Moderate right heart strain.   - Lower extremity ultrasound showed nonocclusive DVT in left lower extremity  - Echo 12/15 showed normal LV systolic function with EF 65-70%, no wall motion abnormalities, normal RV size and systolic function.  - pulmonary embolism is likely due to noncompliance with apixaban and sedentary status  - IR consulted.  Patient would benefit from thrombectomy but has declined invasive procedures.  She also declined IVC filter placement  -Initially started on IV heparin infusion but due to consistently elevated PTT, now switched to Lovenox 70 mg twice daily.  At discharge, will resume apixaban.  This was discussed with patient.    -Palliative care consulted.  No plan to escalate cares to ICU levels if she worsens  -Discontinue IMC status       Intermittent nausea/vomiting  Duodenal ulcers -on EGD 12/3/2022  Poor PO intake  Failure to thrive  - has had recent hospitalizations for episodic nausea and vomiting.   - Was admitted from 11/1 to 11/15/22, underwent EGD on 12/3/22 by [facility] -found to have small superficial duodenal ulcers. Prilosec was changed to Protonix BID.   - she has continued to have intermittent nausea and vomiting and has not been able to take her medications or maintain good oral intake.   - [facility] consulted.  As next step, CT abdomen/pelvis with IV and oral contrast can be obtained.  This was deferred as patient is choosing hospice at discharge.  - Continue IV Protonix 40 mg BID.  We will switch to p.o. in next couple of days  - PRN antiemetics  - Discontinue IV fluids and encourage oral intake     Diabetes mellitus type II with long-term use of insulin  -PTA on Lantus and NovoLog  -has not been taking insulin due to poor PO intake  -Blood sugars now elevated in 200s range.  Will start on Lantus 10 units daily  -Continue sliding scale insulin  -Monitor blood sugars     Essential  hypertension  Dyslipidemia  Chronic paroxysmal atrial fibrillation  Chronic diastolic CHF with preserved EF of 65-70%, echo 12/15/2022  - Blood pressure mildly elevated   - Continue diltiazem, Imdur, spironolactone  - hold off on PTA Bumex given poor PO intake and dehydration  - Discontinue rosuvastatin  -Currently on Lovenox, will resume Eliquis at discharge      Hypothyroidism  - continue PTA Synthroid     Anxiety/depression  Parkinson's disease  -Continue Sinemet, trazodone, Celexa, Wellbutrin, Aricept, Topamax     Asthma/COPD  - respiratory status stable despite saddle PE  - Continue inhalers including Symbicort     CKD stage III  -BUN 37, creatinine 1.14;  stable   -Discontinue IV fluids      Hypokalemia, potassium 3  -Placed on potassium replacement protocol  -Potassium replaced    Hypovolemic hyponatremia: Na = 134 mmol/L   -Improved to 142 with IV fluids     Mild hypercalcemia: corrected calcium is >10.1, most likely secondary to hypovolemia  -Now improved and in normal range    Hypoalbuminemia: albumin = 2.8 g/dL  - monitor as appropriate              Obesity: Estimated body mass index is 32.72 kg/m  as calculated from the following:    Height as of this encounter: 1.524 m (5').    Weight as of this encounter: 76 kg (167 lb 8.8 oz)., PRESENT ON ADMISSION        Goals of care  Patient currently lives at home.  Her  is unable to care for her.  Palliative care consulted with patient on 12/15/2021.  She wants comfort focused care and wants to enroll in hospice after discharge.  She does not want invasive interventions but is okay with medical management.           Diet: Combination Diet Regular Diet Adult    DVT Prophylaxis: Lovenox  Hussein Catheter: Not present  Central Lines: None  Cardiac Monitoring: None  Code Status: No CPR- Do NOT Intubate      Disposition Plan     Expected Discharge Date: 12/16/2022                The patient's care was discussed with the Bedside Nurse and Patient.    Whitney  MD Trupti  Hospitalist Service  [facility]  Securely message with the [employer] Web Console (learn more here)  Text page via [employer] Paging/Directory         Clinically Significant Risk Factors        # Hypokalemia: Lowest K = 3 mmol/L in last 2 days, will replace as needed  # Hyponatremia: Lowest Na = 134 mmol/L in last 2 days, will monitor as appropriate   # Hypercalcemia: corrected calcium is >10.1, will monitor as appropriate    # Hypoalbuminemia: Lowest albumin = 2.8 g/dL at 12/14/2022  1:40 PM, will monitor as appropriate            # Obesity: Estimated body mass index is 32.72 kg/m  as calculated from the following:    Height as of this encounter: 1.524 m (5').    Weight as of this encounter: 76 kg (167 lb 8.8 oz)., PRESENT ON ADMISSION         ______________________________________________________________________    Interval History   Patient reports she feels okay.  Discussed in detail about IVC filter placement, need for anticoagulation.  She had difficulty understanding but ultimately decision made to not pursue any invasive procedures.  She is agreeable to continuing Eliquis at discharge.    Denies any chest pain or shortness of breath   No able to take her medications       Data reviewed today: I reviewed all medications, new labs and imaging results over the last 24 hours. I personally reviewed no images or EKG's today.    Physical Exam   Vital Signs: Temp: 97.5  F (36.4  C) Temp src: Oral BP: (!) 150/80 Pulse: 87   Resp: 18 SpO2: 96 % O2 Device: None (Room air)    Weight: 167 lbs 8.79 oz    Constitutional-patient is awake and alert, resting in bed, in no acute distress  Cardiovascular-regular rate and rhythm, no murmurs, no edema  Pulmonary-lungs are clear to auscultation bilaterally, no wheezing or rhonchi  GI-abdomen is soft, nontender, nondistended, no hepatosplenomegaly or masses  Integumentary-skin is warm and dry, no rashes or ulcers  Neurological-patient is awake, alert  and oriented x3.  Moving all 4 extremities, normal speech, no focal deficits    Data   Recent Labs   Lab 12/16/22  1144 12/16/22  0812 12/16/22  0559 12/16/22  0156 12/15/22  2136 12/15/22  2126 12/15/22  1812 12/15/22  1207 12/14/22  2351 12/14/22  1523 12/14/22  1340   WBC  --   --   --   --   --   --   --   --   --  9.5  --    HGB  --   --   --   --   --   --   --   --   --  13.2  --    MCV  --   --   --   --   --   --   --   --   --  82  --    PLT  --   --   --   --   --   --   --   --   --  174  --    INR  --   --   --   --   --   --   --   --   --  1.28*  --    NA  --   --   --   --   --   --   --  142  --   --  134   POTASSIUM  --   --  3.8  --   --  3.2*  --  3.0*  --   --  4.3   CHLORIDE  --   --   --   --   --   --   --  106  --   --  96   CO2  --   --   --   --   --   --   --  23  --   --  25   BUN  --   --   --   --   --   --   --  23  --   --  37*   CR  --   --   --   --   --   --   --  0.89  --   --  1.14*   ANIONGAP  --   --   --   --   --   --   --  13  --   --  13   CHRISTOPHER  --   --   --   --   --   --   --  8.5  --   --  9.5   * 238*  --  202*   < >  --    < > 133*   < >  --  274*   ALBUMIN  --   --   --   --   --   --   --   --   --   --  2.8*   PROTTOTAL  --   --   --   --   --   --   --   --   --   --  6.7*   BILITOTAL  --   --   --   --   --   --   --   --   --   --  1.3   ALKPHOS  --   --   --   --   --   --   --   --   --   --  173*   ALT  --   --   --   --   --   --   --   --   --   --  11   AST  --   --   --   --   --   --   --   --   --   --  36    < > = values in this interval not displayed.     No results found for this or any previous visit (from the past 24 hour(s)).  Medications     - MEDICATION INSTRUCTIONS -         buPROPion  300 mg Oral QAM     carbidopa-levodopa  1 tablet Oral At Bedtime     carbidopa-levodopa  2 tablet Oral 4x Daily     citalopram  40 mg Oral Daily     diltiazem ER COATED BEADS  180 mg Oral Daily     donepezil  10 mg Oral At Bedtime     enoxaparin  ANTICOAGULANT  70 mg Subcutaneous Q12H     fluticasone-vilanterol  1 puff Inhalation Daily     insulin aspart  1-7 Units Subcutaneous TID AC     insulin aspart  1-5 Units Subcutaneous At Bedtime     insulin glargine  10 Units Subcutaneous QAM AC     isosorbide mononitrate  30 mg Oral Daily     levothyroxine  88 mcg Oral QAM AC     naltrexone  4 mg Oral At Bedtime     pantoprazole  40 mg Intravenous BID     rosuvastatin  5 mg Oral Daily     sodium chloride (PF)  3 mL Intracatheter Q8H     spironolactone  25 mg Oral Daily     topiramate  50 mg Oral TID     traZODone   mg Oral At Bedtime

## 2022-12-16 NOTE — PROGRESS NOTES
GASTROENTEROLOGY PROGRESS NOTE     IMPRESSION:  1.  Nausea vomiting-previous endoscopy and pictures reviewed, these are smaller superficial ulcers, they do not have the appearance of something that could cause a gastric outlet obstruction, however, they certainly could have progressed, but this is unlikely since she was on PPI therapy.  - The next step in work-up would be to pursue a abdominal and pelvic CT scan with IV and oral contrast. Certainly, she is not a candidate for upper endoscopy, given the significant PE with right heart strain, she would have significant risk for upper endoscopy with sedation.  - Patient has a desire to pursue a comfort approach to her care and plans for hospice on discharge.  We will hold off on any further work-up.  2.  Recent duodenal ulcers- continue twice daily PPI    RECOMMENDATIONS:  1.  Given patient's desire for comfort and plans for hospice on discharge, we will sign off at this point.  2.  If she is willing, could consider PPI therapy twice a day    Tobin Acevedo MD  [facility]  [phone]      ________________________________________________________________________      SUBJECTIVE:  Patient does reiterate that she wants to pursue a comfort approach to her care and the plans for hospice.       OBJECTIVE:  BP (!) 158/84 (BP Location: Right arm)   Pulse 80   Temp 97.5  F (36.4  C) (Oral)   Resp 18   Ht 1.524 m (5')   Wt 76 kg (167 lb 8.8 oz)   SpO2 97%   BMI 32.72 kg/m    Temp (24hrs), Av.7  F (36.5  C), Min:97.5  F (36.4  C), Max:98.1  F (36.7  C)    Patient Vitals for the past 72 hrs:   Weight   12/15/22 0400 76 kg (167 lb 8.8 oz)   22 1305 82.6 kg (182 lb)        PHYSICAL EXAM  GEN: Alert, NAD.    Remainder of exam deferred    Additional Data:  I have reviewed the patient's new clinical lab results:     Recent Labs   Lab Test 22  1523 22  0945 22  0812 22  0845   WBC 9.5  --   --  6.8   HGB 13.2 11.4* 10.8* 12.3    MCV 82  --   --  84     --   --  314   INR 1.28*  --   --   --      Recent Labs   Lab Test 12/16/22  0812 12/16/22  0559 12/16/22  0156 12/15/22  2136 12/15/22  2126 12/15/22  1812 12/15/22  1207 12/14/22  2351 12/14/22  1340 07/05/22  0945   NA  --   --   --   --   --   --  142  --  134 138   POTASSIUM  --  3.8  --   --  3.2*  --  3.0*  --  4.3 3.9   CHLORIDE  --   --   --   --   --   --  106  --  96 101   CO2  --   --   --   --   --   --  23  --  25 21*   BUN  --   --   --   --   --   --  23  --  37* 25.0*   CR  --   --   --   --   --   --  0.89  --  1.14* 1.33*   ANIONGAP  --   --   --   --   --   --  13  --  13 16*   CHRISTOPHER  --   --   --   --   --   --  8.5  --  9.5 9.5   *  --  202* 223*  --    < > 133*   < > 274* 130*    < > = values in this interval not displayed.     Recent Labs   Lab Test 12/14/22  1340 06/29/22  1800 12/10/18  1200   ALBUMIN 2.8*  --   --    BILITOTAL 1.3  --   --    ALT 11  --   --    AST 36  --   --    PROTEIN  --  Negative Negative

## 2022-12-16 NOTE — PROGRESS NOTES
Reviewed chart. Noted plan to pursue comfort approach at discharge, thus no role for aggressive nutrition intervention/assessment at this time. Diet as tolerated, per MD.   Please consult should needs arise. Thank you.     Alaina Altamirano RD, LD  [facility], [facility], [facility]  Pager: [phone]  Weekend Pager: [phone]

## 2022-12-16 NOTE — PLAN OF CARE
Goal Outcome Evaluation:    A&Ox 3-4. VSS on RA. Tele NSR. Denies pain. Up with lift. Regular diet, pt refusing NPO. Very small UOP /incontinent, bladder scan 350cc, refused straight cath. Encouraged T/R, pt refused. LS diminished. BS active, small BM this shift. Hep gtt infusing at 600 units/hr, recheck at 9am. K recheck 3.8. Pt hoping to discharge to hospice, SW/CC following.

## 2022-12-17 NOTE — PLAN OF CARE
"Goal Outcome Evaluation:        [id number]    A&O to self and place. Pt reported \"Parkinson pain\" \"pain everywhere\" - gave PRN tylenol with some relief. Incontinent B/B. Takes pills whole w/water. VSS, on RA. Shifting in bed. Tele SR. Alarms on. Plan for discharge LTC vs Hospice  Continue to monitor.                    "

## 2022-12-17 NOTE — PROGRESS NOTES
[facility]    Medicine Progress Note - Hospitalist Service    Date of Admission:  12/14/2022    Assessment & Plan      Jami Cordon is a 72 year old female with diabetes, hypertension, dyslipidemia, h/o pulmonary embolism, paroxysmal atrial fibrillation, on Eliquis, asthma/COPD, diastolic CHF, CKD III, hypothyroidism, GERD, duodenal ulcer, IBS, anxiety/depression, SANDI, Parkinson'sdisease, overactive bladder, who presented to ED on 12/14/2022 with generalized weakness, failure to thrive, dyspnea on exertion.  She was found to have acute saddle pulmonary embolism with right heart strain.     She had not been taking Eliquis at home due to nausea and vomiting.  Patient declined thrombectomy and IVC filter placement.  She declined all invasive procedures.  She was treated initially with IV heparin infusion and subsequently transitioned to subcu Lovenox with plan to resume Eliquis at discharge.     She has had recent hospitalizations for nausea and vomiting. She was admitted from 11/1 to 11/15/22 due to nausea and vomiting.  Underwent EGD on 12/3/2022 by [facility] and found to have duodenal ulcers.  Was switched from omeprazole to Protonix twice daily.  Continues to have nausea and vomiting and poor oral intake.  Because of nausea and vomiting, she had not been taking her medications.      is unable to take care of patient at home.  Palliative care consulted.  Patient interested in comfort focused treatment and plans to enroll in hospice after discharge.       Acute saddle pulmonary embolism with right heart strain  H/o PE (on Eliquis PTA)  NSTEMI due to type II MI-secondary to acute PE with right heart strain  Nonocclusive DVT in left popliteal, posterior, tibial and peroneal veins  Medication noncompliance  - D dimer elevated at 17.35; hemodynamically stable and no chest pain.  Not hypoxic  - CT chest 12/14- showed - Saddle PE with large amount of thrombus in right and left main pulmonary  arteries extending into the lobar and segmental branches of all lobes. Moderate right heart strain.   - Lower extremity ultrasound showed nonocclusive DVT in left lower extremity  - Echo 12/15 showed normal LV systolic function with EF 65-70%, no wall motion abnormalities, normal RV size and systolic function.  - pulmonary embolism is likely due to noncompliance with apixaban and sedentary status  - IR consulted.  Patient declined both thrombectomy and IVC filter placement.  She does not want any invasive procedures.    - Initially started on IV heparin infusion but due to consistently elevated PTT, switched to Lovenox 70 mg twice daily on 12/16.  At discharge, will resume apixaban.  This was discussed with patient.    - Palliative care consulted.  No plan to escalate cares to ICU levels if she worsens       Intermittent nausea/vomiting  Duodenal ulcers -on EGD 12/3/2022  Poor PO intake  Failure to thrive  - has had recent hospitalizations for episodic nausea and vomiting.   - Was admitted from 11/1 to 11/15/22, underwent EGD on 12/3/22 by [facility] -found to have small superficial duodenal ulcers. Prilosec was changed to Protonix BID.   - she has continued to have intermittent nausea and vomiting and has not been able to take her medications or maintain good oral intake.   - [facility] consulted.  As next step, CT abdomen/pelvis with IV and oral contrast could be obtained.  This was deferred as patient is choosing hospice at discharge.  - Continue Protonix, switch to p.o. twice daily dose   - PRN antiemetics  - Overall appetite has improved     Diabetes mellitus type II with long-term use of insulin, with peripheral neuropathy  - PTA on Lantus and NovoLog  - has not been taking insulin due to poor PO intake  - Blood sugars better controlled with Lantus 10 units daily, will continue   - Continue sliding scale insulin  - Monitor blood sugars  - Start on pregabalin 25 mg 3 times daily for neuropathic pain     Essential  hypertension  Dyslipidemia  Chronic paroxysmal atrial fibrillation  Chronic diastolic CHF with preserved EF of 65-70%, echo 12/15/2022  - Blood pressure mildly elevated   - Continue diltiazem, Imdur, spironolactone  - hold off on PTA Bumex given poor PO intake and dehydration  - Discontinued rosuvastatin  - Currently on Lovenox, will resume Eliquis at discharge      Hypothyroidism  - continue PTA Synthroid     Anxiety/depression  Parkinson's disease  -Continue Sinemet, trazodone, Celexa, Wellbutrin, Aricept, Topamax     Asthma/COPD  - respiratory status stable despite saddle PE  - Continue inhalers including Symbicort     CKD stage III  -BUN 37, creatinine 1.14;  stable   -Monitor     Hypokalemia, potassium 3  -Placed on potassium replacement protocol  -Potassium replaced    Hypovolemic hyponatremia: Na = 134 mmol/L   -Improved to 142 with IV fluids     Mild hypercalcemia: corrected calcium is >10.1, most likely secondary to hypovolemia  -Now improved and in normal range    Hypoalbuminemia: albumin = 2.8 g/dL  - monitor as appropriate              Obesity: Estimated body mass index is 32.72 kg/m  as calculated from the following:    Height as of this encounter: 1.524 m (5').    Weight as of this encounter: 76 kg (167 lb 8.8 oz)., PRESENT ON ADMISSION        Goals of care  Patient currently lives at home.  Her  is unable to care for her.  Palliative care consulted with patient on 12/15/2021.  She wants comfort focused care and wants to enroll in hospice after discharge.  She does not want invasive interventions but is okay with medical management.           Diet: Combination Diet Regular Diet Adult  Snacks/Supplements Adult: Glucerna; Between Meals    DVT Prophylaxis: Lovenox  Hussein Catheter: Not present  Central Lines: None  Cardiac Monitoring: None  Code Status: No CPR- Do NOT Intubate      Disposition Plan      Expected Discharge Date: 12/19/2022                The patient's care was discussed with the  Bedside Nurse and Patient.    Whitney Lyles MD  Hospitalist Service  [facility]  Securely message with the [employer] Web Console (learn more here)  Text page via [employer] Paging/Directory         Clinically Significant Risk Factors        # Hypokalemia: Lowest K = 3 mmol/L in last 2 days, will replace as needed       # Hypoalbuminemia: Lowest albumin = 2.8 g/dL at 12/14/2022  1:40 PM, will monitor as appropriate            # Obesity: Estimated body mass index is 32.72 kg/m  as calculated from the following:    Height as of this encounter: 1.524 m (5').    Weight as of this encounter: 76 kg (167 lb 8.8 oz)., PRESENT ON ADMISSION         ______________________________________________________________________    Interval History   Patient reports she had a bad night due to neuropathic pain   Reports her nausea has improved.  She is eating better than before   No other complaints   No chest pain or shortness of breath         Data reviewed today: I reviewed all medications, new labs and imaging results over the last 24 hours. I personally reviewed no images or EKG's today.    Physical Exam   Vital Signs: Temp: 97.4  F (36.3  C) Temp src: Oral BP: (!) 151/92 Pulse: 71   Resp: 16 SpO2: 97 % O2 Device: None (Room air)    Weight: 167 lbs 8.79 oz    Constitutional-patient is awake and alert, resting in bed, in no acute distress  Cardiovascular-regular rate and rhythm, no murmurs, no edema  Pulmonary-lungs are clear to auscultation bilaterally, no wheezing or rhonchi  GI-abdomen is soft, nontender, nondistended, no hepatosplenomegaly or masses  Integumentary-skin is warm and dry, no rashes or ulcers  Neurological-patient is awake, alert and oriented x3.  Moving all 4 extremities, normal speech, no focal deficits    Data   Recent Labs   Lab 12/17/22  0804 12/17/22  0223 12/16/22 2051 12/16/22  0812 12/16/22  0559 12/15/22  2136 12/15/22  2126 12/15/22  1812 12/15/22  1207 12/14/22  2351 12/14/22  1523  12/14/22  1340   WBC  --   --   --   --   --   --   --   --   --   --  9.5  --    HGB  --   --   --   --   --   --   --   --   --   --  13.2  --    MCV  --   --   --   --   --   --   --   --   --   --  82  --    PLT  --   --   --   --   --   --   --   --   --   --  174  --    INR  --   --   --   --   --   --   --   --   --   --  1.28*  --    NA  --   --   --   --   --   --   --   --  142  --   --  134   POTASSIUM  --   --   --   --  3.8  --  3.2*  --  3.0*  --   --  4.3   CHLORIDE  --   --   --   --   --   --   --   --  106  --   --  96   CO2  --   --   --   --   --   --   --   --  23  --   --  25   BUN  --   --   --   --   --   --   --   --  23  --   --  37*   CR  --   --   --   --   --   --   --   --  0.89  --   --  1.14*   ANIONGAP  --   --   --   --   --   --   --   --  13  --   --  13   CHRISTOPHER  --   --   --   --   --   --   --   --  8.5  --   --  9.5   * 209* 202*   < >  --    < >  --    < > 133*   < >  --  274*   ALBUMIN  --   --   --   --   --   --   --   --   --   --   --  2.8*   PROTTOTAL  --   --   --   --   --   --   --   --   --   --   --  6.7*   BILITOTAL  --   --   --   --   --   --   --   --   --   --   --  1.3   ALKPHOS  --   --   --   --   --   --   --   --   --   --   --  173*   ALT  --   --   --   --   --   --   --   --   --   --   --  11   AST  --   --   --   --   --   --   --   --   --   --   --  36    < > = values in this interval not displayed.     No results found for this or any previous visit (from the past 24 hour(s)).  Medications     - MEDICATION INSTRUCTIONS -         buPROPion  300 mg Oral QAM     carbidopa-levodopa  1 tablet Oral At Bedtime     carbidopa-levodopa  2 tablet Oral 4x Daily     citalopram  40 mg Oral Daily     diltiazem ER COATED BEADS  180 mg Oral Daily     donepezil  10 mg Oral At Bedtime     enoxaparin ANTICOAGULANT  70 mg Subcutaneous Q12H     fluticasone-vilanterol  1 puff Inhalation Daily     insulin aspart  1-7 Units Subcutaneous TID AC     insulin aspart  1-5  Units Subcutaneous At Bedtime     insulin glargine  10 Units Subcutaneous QAM AC     isosorbide mononitrate  30 mg Oral Daily     levothyroxine  88 mcg Oral QAM AC     naltrexone  4 mg Oral At Bedtime     pantoprazole  40 mg Intravenous BID     sodium chloride (PF)  3 mL Intracatheter Q8H     spironolactone  25 mg Oral Daily     topiramate  50 mg Oral TID     traZODone   mg Oral At Bedtime

## 2022-12-17 NOTE — PROVIDER NOTIFICATION
"Brief update:    Paged regarding generalized pain with burning sensation.  Patient reported to nursing that this is her \"Parkinson's pain.\"    Patient had been requesting a medication that \"started with an N.\"    Was taken off of naproxen for GI bleed, question if this was patient's described medication for this discomfort.    Patient also not on her prior to admission as needed oxycodone dosing.  Per nursing she is still slow to respond, and with generalized burning pain, do not anticipate this discomfort will be responsive to narcotic therapy.    Try additional single dose Sinemet; if this is not palliative could attempt oxycodone 5 mg x 1, and if still no response, neck step might be addition of gabapentin 100 mg.  I discussed with nursing staff.    Adair Ceron MD  3:08 AM    "

## 2022-12-18 NOTE — PLAN OF CARE
Goal Outcome Evaluation:  Shift 12/18/22 [id number]    Patient is not alert or oriented. Has been semicomatose since beginning of shift. Hospitalist is aware, ordered a head CT and ABG- both came back clear, culprit most likely due to combination of trazodone and lyrica. Did not receive her morning dose of oral medications. VSS on RA. Not sure if she is experiencing pain or not, VENICE. Morning BG was 138. Diet is regular. Incontinent of B&B- purwick in place with adequate UOP, no BM this shift. Assist of two with lift or GB/W when appropriate. Discharge pending Long term care/ hospice placement.

## 2022-12-18 NOTE — PLAN OF CARE
Neuro: A&Ox2 disorientated to time and situation  Tele/Cardiac: SR  Resp: WDL  Activity: A2, turn/repo  Pain: generalized pain reported related to parkinson's, scheduled meds given for relief  Drips/IV: SL  GI/: incontinent bowel/bladder  Skin: blanchable redness coccyx  Diet: Diet: Combination Diet Regular Diet Adult  Snacks/Supplements Adult: Glucerna; Between Meals     Test/Procedures: none  Plan: discharge to long term care/hospice. Awaiting placement

## 2022-12-18 NOTE — PLAN OF CARE
Goal Outcome Evaluation:               Alert to self and place. VSS on RA. Potassium 3.5. Blood sugar checks (152, 200, 187). Incontinent B/B. Reddened margret area and bottom. Takes pills whole w/ water. Refused meds this evening d/t being too tired. Refused to get oob. T/R.  Denies pain. DIC LTC w/ hospice pending

## 2022-12-18 NOTE — PROVIDER NOTIFICATION
Brief update:    Paged with request for prior to admission Flonase; patient apparently declining cares and interventions until Flonase is ordered    Have reordered Flonase.    I have also ordered nasal saline spray for nasal congestion available as needed.    Adair Ceron MD  11:51 PM

## 2022-12-18 NOTE — PROVIDER NOTIFICATION
MD Notification    Notified Person: MD    Notified Person Name: Dr. Lyles Whitney    Notification Date/Time: 12/18/22, 1600    Notification Interaction:    Purpose of Notification:  Patient still somnolent, only open her eyes for stimulation, unable to take any of PO meds/intake. Also no urine out-put on external cath, bladder scan showed 445 ml   Orders Received:  Hussein ordered, plan to take UA sample, no other order  Comments:

## 2022-12-18 NOTE — PROGRESS NOTES
Rocky's test performed prior to ABG draw. Collateral circulation confirmed. Sample collected from the patients right radial artery while on RA.    Clifford Blanton, RT

## 2022-12-18 NOTE — PROGRESS NOTES
[facility]    Medicine Progress Note - Hospitalist Service    Date of Admission:  12/14/2022    Assessment & Plan      Jami Cordon is a 72 year old female with diabetes, hypertension, dyslipidemia, h/o pulmonary embolism, paroxysmal atrial fibrillation, on Eliquis, asthma/COPD, diastolic CHF, CKD III, hypothyroidism, GERD, duodenal ulcer, IBS, anxiety/depression, SANDI, Parkinson'sdisease, overactive bladder, who presented to ED on 12/14/2022 with generalized weakness, failure to thrive, dyspnea on exertion.  She was found to have acute saddle pulmonary embolism with right heart strain.     She had not been taking Eliquis at home due to nausea and vomiting.  Patient declined thrombectomy and IVC filter placement.  She declined all invasive procedures.  She was treated initially with IV heparin infusion and subsequently transitioned to subcu Lovenox with plan to resume Eliquis at discharge.     She has had recent hospitalizations for nausea and vomiting. She was admitted from 11/1 to 11/15/22 due to nausea and vomiting.  Underwent EGD on 12/3/2022 by [facility] and found to have duodenal ulcers.  Was switched from omeprazole to Protonix twice daily.  Continues to have nausea and vomiting and poor oral intake.  Because of nausea and vomiting, she had not been taking her medications.      is unable to take care of patient at home.  Palliative care consulted.  Patient interested in comfort focused treatment and plans to enroll in hospice after discharge.       Acute saddle pulmonary embolism with right heart strain  H/o PE (on Eliquis PTA)  NSTEMI due to type II MI-secondary to acute PE with right heart strain  Nonocclusive DVT in left popliteal, posterior, tibial and peroneal veins  Medication noncompliance  - D dimer elevated at 17.35; hemodynamically stable and no chest pain.  Not hypoxic  - CT chest 12/14- showed - Saddle PE with large amount of thrombus in right and left main pulmonary  arteries extending into the lobar and segmental branches of all lobes. Moderate right heart strain.   - Lower extremity ultrasound showed nonocclusive DVT in left lower extremity  - Echo 12/15 showed normal LV systolic function with EF 65-70%, no wall motion abnormalities, normal RV size and systolic function.  - pulmonary embolism is likely due to noncompliance with apixaban and sedentary status  - IR consulted.  Patient declined both thrombectomy and IVC filter placement.  She does not want any invasive procedures.    - Initially started on IV heparin infusion but due to consistently elevated PTT, switched to Lovenox 70 mg twice daily on 12/16.  At discharge, will resume apixaban.  This was discussed with patient.    - Palliative care consulted.  No plan to escalate cares to ICU levels if she worsens    Acute metabolic encephalopathy, 12/18/2022  -Patient unable to ON 12/18  -Head CT shows no acute change ABGs showed no hypercapnia, electrolytes okay, no evidence of infection  -Could be medication related.  Started on Lyrica yesterday.  Is also on trazodone  -Hold Lyrica and trazodone  -Check UA  -continue to monitor.    Acute thrombocytopenia, platelets 174 --> 98  -Most likely due to consumption from pulmonary embolism  -Low probability for heparin-induced thrombocytopenia.  T4 score of 3  -We will switch to argatroban and check for heparin-induced thrombocytopenia    Acute anemia, hemoglobin 13.2 --> 10.3  -Hemoglobin has declined from 13.2 to 10.3.  This is most likely secondary to pulmonary embolism.  No other obvious bleed noted  -Monitor     Intermittent nausea/vomiting  Duodenal ulcers -on EGD 12/3/2022  Poor PO intake  Failure to thrive  - has had recent hospitalizations for episodic nausea and vomiting.   - Was admitted from 11/1 to 11/15/22, underwent EGD on 12/3/22 by [facility] -found to have small superficial duodenal ulcers. Prilosec was changed to Protonix BID.   - she has continued to have intermittent  nausea and vomiting and has not been able to take her medications or maintain good oral intake.   - [facility] consulted.  As next step, CT abdomen/pelvis with IV and oral contrast could be obtained.  This was deferred as patient is choosing hospice at discharge.  - Continue Protonix, switch to p.o. twice daily dose   - PRN antiemetics  - Overall appetite has improved     Diabetes mellitus type II with long-term use of insulin, with peripheral neuropathy  - PTA on Lantus and NovoLog  - has not been taking insulin due to poor PO intake  - Blood sugars better controlled with Lantus 10 units daily, will continue   - Continue sliding scale insulin  - Monitor blood sugars  -Hold pregabalin 25 mg 3 times daily for neuropathic pain due to acute metabolic encephalopathy     Essential hypertension  Dyslipidemia  Chronic paroxysmal atrial fibrillation  Chronic diastolic CHF with preserved EF of 65-70%, echo 12/15/2022  - Blood pressure mildly elevated   - Continue diltiazem, Imdur, spironolactone  - hold off on PTA Bumex given poor PO intake and dehydration  - Discontinued rosuvastatin  - Currently on Lovenox, will resume Eliquis at discharge      Hypothyroidism  - continue PTA Synthroid     Anxiety/depression  Parkinson's disease  -Continue Sinemet, Celexa, Wellbutrin, Aricept, Topamax  -Hold trazodone     Asthma/COPD  - respiratory status stable despite saddle PE  - Continue inhalers including Symbicort     CKD stage III  -BUN 37, creatinine 1.14;  stable   -Monitor     Hypokalemia, potassium 3  -Placed on potassium replacement protocol  -Potassium replaced    Hypovolemic hyponatremia: Na = 134 mmol/L   -Improved to 142 with IV fluids     Mild hypercalcemia: corrected calcium is >10.1, most likely secondary to hypovolemia  -Now improved and in normal range    Hypoalbuminemia: albumin = 2.8 g/dL  - monitor as appropriate              Obesity: Estimated body mass index is 32.72 kg/m  as calculated from the following:    Height as  of this encounter: 1.524 m (5').    Weight as of this encounter: 76 kg (167 lb 8.8 oz)., PRESENT ON ADMISSION        Goals of care  Patient currently lives at home.  Her  is unable to care for her.  Palliative care consulted with patient on 12/15/2021.  She wants comfort focused care and wants to enroll in hospice after discharge.  She does not want invasive interventions but is okay with medical management.           Diet: Combination Diet Regular Diet Adult  Snacks/Supplements Adult: Glucerna; Between Meals    DVT Prophylaxis: Lovenox  Hussein Catheter: Not present  Central Lines: None  Cardiac Monitoring: None  Code Status: No CPR- Do NOT Intubate      Disposition Plan     Expected Discharge Date: 12/19/2022                The patient's care was discussed with the Bedside Nurse and Patient.    Whitney Lyles MD  Hospitalist Service  [facility]  Securely message with the Vocera Web Console (learn more here)  Text page via [employer] Paging/Directory         Clinically Significant Risk Factors              # Hypoalbuminemia: Lowest albumin = 2.2 g/dL at 12/18/2022 11:28 AM, will monitor as appropriate   # Thrombocytopenia: Lowest platelets = 98 in last 2 days, will monitor for bleeding          # Obesity: Estimated body mass index is 32.72 kg/m  as calculated from the following:    Height as of this encounter: 1.524 m (5').    Weight as of this encounter: 76 kg (167 lb 8.8 oz)., PRESENT ON ADMISSION         ______________________________________________________________________    Interval History   Patient more somnolent today.  She is not waking up.  Is able to open eyes very briefly   No focal neurological deficits   Head CT negative   ABGs ok          Data reviewed today: I reviewed all medications, new labs and imaging results over the last 24 hours. I personally reviewed no images or EKG's today.    Physical Exam   Vital Signs: Temp: 98.8  F (37.1  C) Temp src: Axillary BP: 136/63  Pulse: 72   Resp: 16 SpO2: 94 % O2 Device: None (Room air)    Weight: 167 lbs 8.79 oz    Constitutional-patient is somnolent, resting in bed, in no acute distress  Cardiovascular-regular rate and rhythm, no murmurs, no edema  Pulmonary-lungs are clear to auscultation bilaterally, no wheezing or rhonchi  GI-abdomen is soft, nontender, nondistended, no hepatosplenomegaly or masses  Integumentary-skin is warm and dry, no rashes or ulcers  Neurological- Moving all 4 extremities, normal speech, no focal deficits    Data   Recent Labs   Lab 12/18/22  1128 12/18/22  0949 12/18/22  0201 12/17/22  1303 12/17/22  1200 12/16/22  0812 12/16/22  0559 12/15/22  1812 12/15/22  1207 12/14/22  2351 12/14/22  1523 12/14/22  1340   WBC 5.5  --   --   --   --   --   --   --   --   --  9.5  --    HGB 10.3*  --   --   --   --   --   --   --   --   --  13.2  --    MCV 81  --   --   --   --   --   --   --   --   --  82  --    PLT 98*  --   --   --  152  --   --   --   --   --  174  --    INR  --   --   --   --   --   --   --   --   --   --  1.28*  --      --   --   --   --   --   --   --  142  --   --  134   POTASSIUM 4.1  --   --   --  3.5  --  3.8   < > 3.0*  --   --  4.3   CHLORIDE 106  --   --   --   --   --   --   --  106  --   --  96   CO2 23  --   --   --   --   --   --   --  23  --   --  25   BUN 13  --   --   --   --   --   --   --  23  --   --  37*   CR 0.92  --   --   --   --   --   --   --  0.89  --   --  1.14*   ANIONGAP 7  --   --   --   --   --   --   --  13  --   --  13   CHRISTOPHER 8.5  --   --   --   --   --   --   --  8.5  --   --  9.5   * 138* 235*   < >  --    < >  --    < > 133*   < >  --  274*   ALBUMIN 2.2*  --   --   --   --   --   --   --   --   --   --  2.8*   PROTTOTAL 4.9*  --   --   --   --   --   --   --   --   --   --  6.7*   BILITOTAL 0.4  --   --   --   --   --   --   --   --   --   --  1.3   ALKPHOS 101  --   --   --   --   --   --   --   --   --   --  173*   ALT 7  --   --   --   --   --   --   --    --   --   --  11   AST 21  --   --   --   --   --   --   --   --   --   --  36    < > = values in this interval not displayed.     Recent Results (from the past 24 hour(s))   CT Head w/o Contrast    Narrative    EXAM: CT HEAD WITHOUT CONTRAST  LOCATION: [facility]  DATE/TIME: 12/18/2022, 12:09 PM    INDICATION: Obtunded today, is on anticoagulation.  COMPARISON: Brain MRI and head CT 12/14/2022.  TECHNIQUE: Routine CT Head without IV contrast. Multiplanar reformats. Dose reduction techniques were used.    FINDINGS:  INTRACRANIAL CONTENTS: No intracranial hemorrhage, extra-axial collection, or mass effect.  No CT evidence of acute infarct. Chronic left occipital infarct. Mild presumed chronic small vessel ischemic changes. Mild to moderate generalized volume loss.   Small presumed cavernoma in the subcortical left parietal white matter..     VISUALIZED ORBITS/SINUSES/MASTOIDS: No intraorbital abnormality. No paranasal sinus mucosal disease. No middle ear or mastoid effusion.    BONES/SOFT TISSUES: No acute abnormality.      Impression    IMPRESSION:  1.  No acute intracranial finding or interval change.         Medications     - MEDICATION INSTRUCTIONS -         buPROPion  300 mg Oral QAM     carbidopa-levodopa  1 tablet Oral At Bedtime     carbidopa-levodopa  2 tablet Oral 4x Daily     citalopram  40 mg Oral Daily     diltiazem ER COATED BEADS  180 mg Oral Daily     donepezil  10 mg Oral At Bedtime     enoxaparin ANTICOAGULANT  70 mg Subcutaneous Q12H     fluticasone  2 spray Both Nostrils Daily     fluticasone-vilanterol  1 puff Inhalation Daily     insulin aspart  1-7 Units Subcutaneous TID AC     insulin aspart  1-5 Units Subcutaneous At Bedtime     insulin glargine  16 Units Subcutaneous QAM AC     isosorbide mononitrate  30 mg Oral Daily     levothyroxine  88 mcg Oral QAM AC     naltrexone  4 mg Oral At Bedtime     pantoprazole  40 mg Oral BID AC     [Held by provider] pregabalin  25  mg Oral TID     sodium chloride (PF)  3 mL Intracatheter Q8H     spironolactone  25 mg Oral Daily     topiramate  50 mg Oral TID     [Held by provider] traZODone   mg Oral At Bedtime

## 2022-12-18 NOTE — PROGRESS NOTES
Care Management Follow Up    Length of Stay (days): 4    Expected Discharge Date: 12/19/2022     Concerns to be Addressed:   Discharge planning    Patient plan of care discussed at interdisciplinary rounds: Yes    Anticipated Discharge Disposition: Long Term Care with hospice     Anticipated Discharge Services:  N/A  Anticipated Discharge DME:  N/A    Patient/family educated on Medicare website which has current facility and service quality ratings:  no  Education Provided on the Discharge Plan:  no  Patient/Family in Agreement with the Plan: yes    Referrals Placed by CM/SW:  SNF referrals, financial office  Private pay costs discussed: Not applicable    Additional Information:  Reviewed chart.  Referrals have been sent to check bed availability at [facility], [facility], and [facility].  Call placed and message left for [facility] to follow up on the referral and to inquire about patient's insurance as her facesheet states that patient has Medicare and Blue Cross and her  states that she has MA.  Resent [facility] to [facility] as [facility].  The discharge navigator states that [facility] and [facility] no available beds.  Also sent a referral to the financial office to inquire as to whether patient has MA.    Will continue to follow.      SALVATORE Andrew, [facility]    [phone]  [facility]

## 2022-12-19 NOTE — PLAN OF CARE
Goal Outcome Evaluation:  DATE & TIME: 12/18/22, [id number]  Cognitive Concerns/ Orientation : oriented to self, mostly somnolent, open her eyes for deep stimulation, and able to state her name and fall back to deep sleep.   BEHAVIOR & AGGRESSION TOOL COLOR: calm  ABNL VS/O2: Vitals stable, room air sat WDL  MOBILITY: Turn and reposition every two hours  PAIN MANAGMENT: no non-verbal indicators  DIET: regular diet  BOWEL/BLADDER: samano placed for retention. Per previous report she was incontinent   ABNL LAB/BG: QZC=021/97  DRAIN/DEVICES: PIV times two  TELEMETRY RHYTHM: SR with 1st degree AVB  SKIN: Bruise on arms   TESTS/PROCEDURES:   D/C DATE: Pending  OTHER IMPORTANT INFO: Patient still somnolent this shift, MD updated. She had a work up in the previous shift and no acute finding. Also UA send this domenico and was negative. Could be medication related. Plan Hold Lyrica and trazodone.   Argatroban started 2129, plan to monitor PTT 2 hrs after 2330. Continue to monitor.

## 2022-12-19 NOTE — PROGRESS NOTES
[facility]    Medicine Progress Note - Hospitalist Service    Date of Admission:  12/14/2022    Assessment & Plan      Jami Cordon is a 72 year old female with diabetes, hypertension, dyslipidemia, h/o pulmonary embolism, paroxysmal atrial fibrillation, on Eliquis, asthma/COPD, diastolic CHF, CKD III, hypothyroidism, GERD, duodenal ulcer, IBS, anxiety/depression, SANDI, Parkinson'sdisease, overactive bladder, who presented to ED on 12/14/2022 with generalized weakness, failure to thrive, dyspnea on exertion.  She was found to have acute saddle pulmonary embolism with right heart strain.     She had not been taking Eliquis at home due to nausea and vomiting.  Patient declined thrombectomy and IVC filter placement.  She declined all invasive procedures.  She was treated initially with IV heparin infusion and subsequently transitioned to subcu Lovenox with plan to resume Eliquis at discharge.     She has had recent hospitalizations for nausea and vomiting. She was admitted from 11/1 to 11/15/22 due to nausea and vomiting.  Underwent EGD on 12/3/2022 by Netta and found to have duodenal ulcers.  Was switched from omeprazole to Protonix twice daily.  Continues to have nausea and vomiting and poor oral intake.  Because of nausea and vomiting, she had not been taking her medications.      is unable to take care of patient at home.  Palliative care consulted.  Patient interested in comfort focused treatment and plans to enroll in hospice after discharge.         Acute saddle pulmonary embolism with right heart strain  H/o PE (on Eliquis PTA)  NSTEMI due to type II MI-secondary to acute PE with right heart strain  Nonocclusive DVT in left popliteal, posterior, tibial and peroneal veins  Medication noncompliance  - D dimer elevated at 17.35; hemodynamically stable and no chest pain.  Not hypoxic  - CT chest 12/14- showed - Saddle PE with large amount of thrombus in right and left main  pulmonary arteries extending into the lobar and segmental branches of all lobes. Moderate right heart strain.   - Lower extremity ultrasound showed nonocclusive DVT in left lower extremity  - Echo 12/15 showed normal LV systolic function with EF 65-70%, no wall motion abnormalities, normal RV size and systolic function.  - pulmonary embolism is likely due to noncompliance with apixaban and sedentary status  - IR consulted.  Patient declined both thrombectomy and IVC filter placement.  She does not want any invasive procedures.    - Initially started on IV heparin infusion but due to consistently elevated PTT, switched to Lovenox 70 mg twice daily on 12/16.  Platelets noted to be 98 on 12/18.  Due to concern of HIT was switched to argatroban.  We will switch to apixaban once she is more alert  - Palliative care consulted.  No plan to escalate cares to ICU levels if she worsens    Acute toxic encephalopathy, 12/18/2022  -Patient was obtunded on 12/18  -Head CT showed no acute change. ABGs showed no hypercapnia, electrolytes okay, no evidence of infection.  -Most likely medication related-due to Lyrica and trazodone.  Lyrica was a new medication.    -Mental status improved 12/19.  No more awake and alert   -Hold Lyrica and trazodone  -continue to monitor.    Acute thrombocytopenia, platelets 174 --> 98, 12/18  -Most likely due to consumption from pulmonary embolism or platelet clumping.  Platelets are now in normal range on 12/19  -Low probability for heparin-induced thrombocytopenia.  T4 score of 3  -Continue argatroban and check for heparin-induced thrombocytopenia    Acute anemia, hemoglobin 13.2 --> 10.3  -Hemoglobin has declined from 13.2 to 10.3.  This is most likely secondary to pulmonary embolism and hemodilution.  No other obvious bleed noted.  Hemoglobin is now stable  -Monitor     Intermittent nausea/vomiting  Duodenal ulcers -on EGD 12/3/2022  Poor PO intake  Failure to thrive  - has had recent  hospitalizations for episodic nausea and vomiting.   - Was admitted from 11/1 to 11/15/22, underwent EGD on 12/3/22 by Netta -found to have small superficial duodenal ulcers. Prilosec was changed to Protonix BID.   - she has continued to have intermittent nausea and vomiting and has not been able to take her medications or maintain good oral intake.   - [facility] consulted.  As next step, CT abdomen/pelvis with IV and oral contrast could be obtained.  This was deferred as patient is choosing hospice at discharge.  - Continue Protonix p.o. twice daily dose   - PRN antiemetics  - Overall appetite has improved     Diabetes mellitus type II with long-term use of insulin, with peripheral neuropathy  - PTA on Lantus and NovoLog  - has not been taking insulin due to poor PO intake  - Blood sugars better controlled with Lantus 16 units daily.  Hold Lantus 12/19 due to ongoing encephalopathy because of which she is not going to eat much  - Continue sliding scale insulin  - Monitor blood sugars       Essential hypertension  Dyslipidemia  Chronic paroxysmal atrial fibrillation  Chronic diastolic CHF with preserved EF of 65-70%, echo 12/15/2022  - Blood pressure ok   - Continue diltiazem, Imdur, spironolactone  - hold off on PTA Bumex given poor PO intake and dehydration  - Discontinued rosuvastatin  - Currently on argatroban, will resume Eliquis when p.o. intake improves       Hypothyroidism  - continue PTA Synthroid     Anxiety/depression  Parkinson's disease  -Continue Sinemet, Celexa, Wellbutrin, Aricept, Topamax  -Hold trazodone     Asthma/COPD  - respiratory status stable despite saddle PE  - Continue inhalers including Symbicort     CKD stage III  -BUN 37, creatinine 0.89;  stable   -Monitor     Hypokalemia, potassium 3  -Placed on potassium replacement protocol  -Potassium replaced    Hypovolemic hyponatremia: Na = 134 mmol/L   -Improved to 142 with IV fluids.  Now down to 137    Mild hypercalcemia: corrected calcium is  >10.1, most likely secondary to hypovolemia  -Now improved and in normal range    Hypoalbuminemia: albumin = 2.8 g/dL  - monitor as appropriate              Obesity: Estimated body mass index is 32.72 kg/m  as calculated from the following:    Height as of this encounter: 1.524 m (5').    Weight as of this encounter: 76 kg (167 lb 8.8 oz)., PRESENT ON ADMISSION        Goals of care  Patient currently lives at home.  Her  is unable to care for her.  Palliative care consulted with patient on 12/15/2021.  She wants comfort focused care and wants to enroll in hospice after discharge.  She does not want invasive interventions but is okay with medical management.           Diet: Combination Diet Regular Diet Adult  Snacks/Supplements Adult: Glucerna; Between Meals    DVT Prophylaxis: Lovenox  Hussein Catheter: PRESENT, indication:    Central Lines: None  Cardiac Monitoring: None  Code Status: No CPR- Do NOT Intubate      Disposition Plan      Expected Discharge Date: 12/19/2022                The patient's care was discussed with the Bedside Nurse and Patient.    Whitney Lyles MD  Hospitalist Service  [facility]  Securely message with the Vocera Web Console (learn more here)  Text page via IFTTT Paging/Directory         Clinically Significant Risk Factors              # Hypoalbuminemia: Lowest albumin = 2.2 g/dL at 12/18/2022 11:28 AM, will monitor as appropriate   # Thrombocytopenia: Lowest platelets = 98 in last 2 days, will monitor for bleeding          # Obesity: Estimated body mass index is 32.25 kg/m  as calculated from the following:    Height as of this encounter: 1.524 m (5').    Weight as of this encounter: 74.9 kg (165 lb 2 oz).          ______________________________________________________________________    Interval History   Patient more alert today.  Opens her eyes, still confused.  Does not answer questions.    No focal neurological deficits             Data reviewed today:  I reviewed all medications, new labs and imaging results over the last 24 hours. I personally reviewed no images or EKG's today.    Physical Exam   Vital Signs: Temp: 97.6  F (36.4  C) Temp src: Axillary BP: (!) 154/84 Pulse: 67   Resp: 18 SpO2: 97 % O2 Device: None (Room air)    Weight: 165 lbs 1.99 oz    Constitutional-patient is somnolent, resting in bed, in no acute distress  Cardiovascular-regular rate and rhythm, no murmurs, no edema  Pulmonary-lungs are clear to auscultation bilaterally, no wheezing or rhonchi  GI-abdomen is soft, nontender, nondistended, no hepatosplenomegaly or masses  Integumentary-skin is warm and dry, no rashes or ulcers  Neurological- Moving all 4 extremities, normal speech, no focal deficits    Data   Recent Labs   Lab 12/19/22  0706 12/19/22  0158 12/18/22  2152 12/18/22  1809 12/18/22  1128 12/17/22  1303 12/17/22  1200 12/15/22  1812 12/15/22  1207 12/14/22  2351 12/14/22  1523 12/14/22  1340   0000   WBC 5.5  --   --   --  5.5  --   --   --   --   --  9.5  --   --    HGB 10.5*  --   --   --  10.3*  --   --   --   --   --  13.2  --   --    MCV 81  --   --   --  81  --   --   --   --   --  82  --   --      --   --   --  98*  --  152  --   --   --  174  --    < >   INR  --   --   --   --   --   --   --   --   --   --  1.28*  --   --      --   --   --  136  --   --   --  142  --   --  134  --    POTASSIUM 3.8  --   --   --  4.1  --  3.5   < > 3.0*  --   --  4.3  --    CHLORIDE 106  --   --   --  106  --   --   --  106  --   --  96  --    CO2 22  --   --   --  23  --   --   --  23  --   --  25  --    BUN 11  --   --   --  13  --   --   --  23  --   --  37*  --    CR 0.89  --   --   --  0.92  --   --   --  0.89  --   --  1.14*  --    ANIONGAP 8  --   --   --  7  --   --   --  13  --   --  13  --    CHRISTOPHER 8.5  --   --   --  8.5  --   --   --  8.5  --   --  9.5  --    * 106* 97   < > 125*   < >  --    < > 133*   < >  --  274*  --    ALBUMIN  --   --   --   --  2.2*  --    --   --   --   --   --  2.8*  --    PROTTOTAL  --   --   --   --  4.9*  --   --   --   --   --   --  6.7*  --    BILITOTAL  --   --   --   --  0.4  --   --   --   --   --   --  1.3  --    ALKPHOS  --   --   --   --  101  --   --   --   --   --   --  173*  --    ALT  --   --   --   --  7  --   --   --   --   --   --  11  --    AST  --   --   --   --  21  --   --   --   --   --   --  36  --     < > = values in this interval not displayed.     Recent Results (from the past 24 hour(s))   CT Head w/o Contrast    Narrative    EXAM: CT HEAD WITHOUT CONTRAST  LOCATION: [facility]  DATE/TIME: 12/18/2022, 12:09 PM    INDICATION: Obtunded today, is on anticoagulation.  COMPARISON: Brain MRI and head CT 12/14/2022.  TECHNIQUE: Routine CT Head without IV contrast. Multiplanar reformats. Dose reduction techniques were used.    FINDINGS:  INTRACRANIAL CONTENTS: No intracranial hemorrhage, extra-axial collection, or mass effect.  No CT evidence of acute infarct. Chronic left occipital infarct. Mild presumed chronic small vessel ischemic changes. Mild to moderate generalized volume loss.   Small presumed cavernoma in the subcortical left parietal white matter..     VISUALIZED ORBITS/SINUSES/MASTOIDS: No intraorbital abnormality. No paranasal sinus mucosal disease. No middle ear or mastoid effusion.    BONES/SOFT TISSUES: No acute abnormality.      Impression    IMPRESSION:  1.  No acute intracranial finding or interval change.         Medications     argatroban 1 mcg/kg/min (12/19/22 1024)     - MEDICATION INSTRUCTIONS -         buPROPion  300 mg Oral QAM     carbidopa-levodopa  1 tablet Oral At Bedtime     carbidopa-levodopa  2 tablet Oral 4x Daily     citalopram  40 mg Oral Daily     diltiazem ER COATED BEADS  180 mg Oral Daily     donepezil  10 mg Oral At Bedtime     fluticasone  2 spray Both Nostrils Daily     fluticasone-vilanterol  1 puff Inhalation Daily     insulin aspart  1-7 Units Subcutaneous TID  AC     insulin aspart  1-5 Units Subcutaneous At Bedtime     [Held by provider] insulin glargine  16 Units Subcutaneous QAM AC     isosorbide mononitrate  30 mg Oral Daily     levothyroxine  88 mcg Oral QAM AC     naltrexone  4 mg Oral At Bedtime     pantoprazole  40 mg Oral BID AC     [Held by provider] pregabalin  25 mg Oral TID     sodium chloride (PF)  3 mL Intracatheter Q8H     spironolactone  25 mg Oral Daily     topiramate  50 mg Oral TID     [Held by provider] traZODone   mg Oral At Bedtime

## 2022-12-19 NOTE — PLAN OF CARE
Goal Outcome Evaluation:  No new changes still somnolent overnight, aroused with repeated stimulation. VSS on RA sat 90s. Tele SR. On continuous Argatroban at 76 mcg/min (4.56 ml/hr). Turn/repositioned q2hrs. Voids good urine output via samano. No non verbal pain indicators noted. Plans for long term care with hospice, SW following.

## 2022-12-19 NOTE — PROGRESS NOTES
[id number]    Pt more awake and alert today. Spontaneously opening eyes, whispers with minimal speech. Following commands. Ate a little bit of breakfast. Argotroban infusion continues, recheck pTT tomorrow morning. Hussein in place for retention. VSS on room air. Tele SR. Plan to eventually discharge to LTC with hospice, SW has referrals out.

## 2022-12-19 NOTE — PROGRESS NOTES
Care Management Follow Up    Length of Stay (days): 5    Expected Discharge Date: 12/21/2022     Concerns to be Addressed:     Discharge planning  Patient plan of care discussed at interdisciplinary rounds: Yes    Anticipated Discharge Disposition: LTC with hospice  Anticipated Discharge Services:  none  Anticipated Discharge DME:  none    Patient/family educated on Medicare website which has current facility and service quality ratings:  no  Education Provided on the Discharge Plan:  yes  Patient/Family in Agreement with the Plan: yes    Referrals Placed by CM/SW:  SNF referrals, financial office  Private pay costs discussed: private room/amenity fees    Additional Information:  Received an email back from the financial office stating that patient is on the alternative care program which covers skilled nursing in the home and not in a SNF.  Updated patient and asked if she knew who her financial worker was or her AC worker was.  Patient states she does not.  Patient states that I should call her friend Cammie Valentin.  Call placed to Cammie to inquire as to whether she knew who patient's worker was.  Cammie suggested I call Carlos from [facility], [phone].  Call placed and message left for Carlos.  Awaiting a return call.  Received a call from patient's  Wiliam, [phone], asking for an update.  Explained that we need assistance with MA.  Patient states that patient has MA.  Explained that patient alternative care with no SNF coverage.  Patient's  states that when she was at [facility] they applied for MA for her.  Explained that patient does not have MA.  Inquired as to whether he would be will to speak with the financial office to assist them in completing the MA paperwork.  Patient's  states he would be willing to assist.  Referral made to the financial office.  Received a message back from [facility].  They have a bed available for patient tomorrow.  They require $5,000 up front.  Call  placed to Cammie Valentin to update her.  Call placed to update patient's .  Call placed to update patient's friend Carmen Sadler.  Also spoke with a care coordinator from [facility] Cheryl Beech, [phone].  She suggested I contact Eli from [facility].  She may have patient's waiver workers information.    Will continue to follow.      SALVATORE Andrew, [facility]    [phone]  [facility]

## 2022-12-20 NOTE — PROGRESS NOTES
Care Management Follow Up    Length of Stay (days): 6    Expected Discharge Date: 12/21/2022     Concerns to be Addressed:     Discharge planning  Patient plan of care discussed at interdisciplinary rounds: Yes    Anticipated Discharge Disposition: SNF with hospice     Anticipated Discharge Services:  none  Anticipated Discharge DME:  none    Patient/family educated on Medicare website which has current facility and service quality ratings:  no  Education Provided on the Discharge Plan:  no  Patient/Family in Agreement with the Plan: yes    Referrals Placed by CM/SW:  SNF referral, hospice referral  Private pay costs discussed: Not applicable    Additional Information:  Spoke with patient's  regarding completing a MA application.  Patient's  is in agreement.  Gave him the [place] office's phone number and requested that he call them as soon as possible.  Received an update from the financial office stating that patient's  completed the MA application.  Requested a copy and sent it to [facility] for review.  Referral made to [facility] to see if patient qualifies for hospice.  Sent the requested information including the health care directive that has Carmen Sadler listed on it as the health care agent.  Awaiting a return call.  Call placed and message left to update Carmen.  Call placed to update patient's friend Cammie.    Will continue to follow.      SALVATORE Andrew, [facility]    [phone]  [facility]

## 2022-12-20 NOTE — PLAN OF CARE
Goal Outcome Evaluation:   More alert overnight compare to yesterday's night. VSS on RA. Tele SR. Argatroban at 76mcg/min 4.56ml/hr)  Denies N/V/pain. Turned/repositioned q2 hrs.  Voids adequate urine output via samano. Plans for long term care with hospice, SW following.

## 2022-12-20 NOTE — PLAN OF CARE
Goal Outcome Evaluation:      Plan of Care Reviewed With: patient    Overall Patient Progress: no changeOverall Patient Progress: no change         VSS, tele SR, denies pain.  Argatroban stopped and eliquis started.  Pt prefers to take pills one at a time with water.  Prefers ice chips, water and boost drink.  Repositioned frequently, selwyn patent, cont to monitor.

## 2022-12-20 NOTE — PLAN OF CARE
Goal Outcome Evaluation: Pt is disoriented to time and situation, slow and forgetful, VSS and on tele SR, on RA, 2 assist to turned and repositioned every 2 hrs, Hussein in, had BM (incontinent), Zofran 4 mg/2 ml given for N&V, poor appetite. Plan for discharge to LTC with Hospice.      Plan of Care Reviewed With: patient    Overall Patient Progress: no changeOverall Patient Progress: no change

## 2022-12-21 NOTE — PROGRESS NOTES
Care Management Discharge Note    Discharge Date: 12/21/2022       Discharge Disposition: Long Term Care, Hospice    Discharge Services: None    Discharge DME: None    Discharge Transportation:  [facility]cher transport at 14:00    Private pay costs discussed: Not applicable    PAS Confirmation Code: [id number]  Patient/family educated on Medicare website which has current facility and service quality ratings: no    Education Provided on the Discharge Plan:  yes  Persons Notified of Discharge Plans: patient's  Wiliam, her friends Cammie and Carmen  Patient/Family in Agreement with the Plan: yes    Handoff Referral Completed: No    Additional Information:  Received a message from Darline at [facility] stating that patient's MA application looked good and they can accept patient today.  Call placed to [facility] to follow up on the referral from yesterday.  Per Wilmer, she states she has not spoken with Carmen as of yet.  They would like to physically meet patient to see if she qualifies for hospice.  They are asking for the discharge orders to be faxed.  Call placed to [facility] to arrange for stretcher transport at 14:00 today as patient is lethargic and she can not support her trunk to safely sit in a chair.  Patient is hospice and needs closer monitoring in the back of the rig.  Faxed the facesheet and PCS to [facility].  Call placed to update [facility] as to the transport time and they state they will be there after 15:00 sometime.  Faxed the discharge orders.  Call placed to update patient's friend Cammie as to the plan and she is in agreement.  Call placed to update patient's friend Carmen, who is the health care agent.  She will follow up with [facility] and sign the consents.  She is also in agreement with the plan.  Call placed to update patient's  and he is also in agreement with the plan.  Received a call back from Carlos Guzman, [phone].  He is patient's AC worker.   Inquired as to whether he can assist in pushing the MA application through faster.  He is asking for a copy of the application and he will watch it.  Updated [facility] and faxed the orders and the PAS.      PAS-RR    D: Per DHS regulation, SW completed and submitted PAS-RR to [place] [employer] Direct Connect via the Senior LinkAge Line.  PAS-RR confirmation # is : [id number].    I: JEFFERY spoke with patient's friends Carmen Bonds and her  and they are aware a PAS-RR has been submitted.  JEFFERY reviewed with patient's friends and  that they may be contacted for a follow up appointment within 10 days of hospital discharge if their SNF stay is < 30 days.  Contact information for [facility] LinkAge Line was also provided.    A: Patient's friends and  verbalized understanding.    P: Further questions may be directed to [facility] Line at #[phone], option #4 for PAS-RR staff.          SALVATORE Andrew, [facility]    [phone]  [facility]

## 2022-12-21 NOTE — PLAN OF CARE
Neuro- D/O to time, place, and situation; very forgetful, slow speech  Most Recent Vitals- Temp: 97.9  F (36.6  C) Temp src: Oral BP: (!) 148/79 Pulse: 72   Resp: 18 SpO2: 96 % O2 Device: None (Room air)   Tele/Cardiac- NSR  Resp- on RA, LS clear/occasional dry cough  Activity- 2A, lift  Pain- denies  Drips- n/a  Drains/Tubes- PIV, samano  Skin- bottom red/blanchable  GI/- samano d/t retention, incontinent of bowel  Aggression Color- Green  COVID status- Negative  Plan- discharge pending to SNF with hospice care      Brittany Vaughn RN

## 2022-12-21 NOTE — PLAN OF CARE
Goal Outcome Evaluation:    Pt. Discharged per order to [facility] per stretcher. Transportation per [facility]. Packet, discharge medications and pt. Belongings sent with pt. Hussein catheter in place.

## 2022-12-21 NOTE — DISCHARGE SUMMARY
[facility]  Hospitalist Discharge Summary      Date of Admission:  12/14/2022  Date of Discharge:  12/21/2022  Discharging Provider: Whitney Lyles MD  Discharge Service: Hospitalist Service    Discharge diagnoses and Hospital course    Jami Cordon is a 72 year old female with diabetes, hypertension, dyslipidemia, h/o pulmonary embolism, paroxysmal atrial fibrillation, on Eliquis, asthma/COPD, diastolic CHF, CKD III, hypothyroidism, GERD, duodenal ulcer, IBS, anxiety/depression, SANDI, Parkinson'sdisease, overactive bladder, who presented to ED on 12/14/2022 with generalized weakness, failure to thrive, dyspnea on exertion.  She was found to have acute saddle pulmonary embolism with right heart strain.      She had not been taking Eliquis at home due to nausea and vomiting.    She declined thrombectomy and IVC filter placement.  She declined all invasive procedures.  She was treated initially with IV heparin infusion and subsequently transitioned to subcu Lovenox.  Eliquis was resumed in hospital.      She has had recent hospitalizations for nausea and vomiting. She was admitted from 11/1 to 11/15/22 due to nausea and vomiting.  Underwent EGD on 12/3/2022 by MnJEOVANY and found to have duodenal ulcers.  Was switched from omeprazole to Protonix twice daily.    Reported ongoing nausea and vomiting on admission leading to poor oral intake and noncompliance with medications.  Her nausea did improve while hospitalized.  Oral intake has improved.    Palliative care consulted.  Patient decided on comfort focused treatment.  She will enroll in hospice after discharge.      Hussein catheter was placed for urinary retention.              Acute saddle pulmonary embolism with right heart strain  H/o PE (on Eliquis PTA)  NSTEMI due to type II MI-secondary to acute PE with right heart strain  Nonocclusive DVT in left popliteal, posterior, tibial and peroneal veins  Medication noncompliance  - D dimer  elevated at 17.35; hemodynamically stable and no chest pain.  Not hypoxic  - CT chest 12/14- showed - Saddle PE with large amount of thrombus in right and left main pulmonary arteries extending into the lobar and segmental branches of all lobes. Moderate right heart strain.   - Lower extremity ultrasound showed nonocclusive DVT in left lower extremity  - Echo 12/15 showed normal LV systolic function with EF 65-70%, no wall motion abnormalities, normal RV size and systolic function.  - pulmonary embolism is likely due to noncompliance with apixaban and sedentary status  - IR consulted.  Patient declined both thrombectomy and IVC filter placement.  She did not want any invasive procedures.    - Initially started on IV heparin infusion but due to consistently elevated PTT, switched to Lovenox 70 mg twice daily on 12/16.  Platelets noted to be 98 on 12/18.  Due to concern of HIT was switched to argatroban.  HIT screen negative.  Switched to apixaban 5 mg bid on 12/20.  Continue apixaban indefinitely/as long as possible       Acute toxic encephalopathy, 12/18/2022  - Patient was obtunded on 12/18  - Head CT showed no acute change. ABGs showed no hypercapnia, electrolytes okay, no evidence of infection.  - Most likely medication related-due to Lyrica and trazodone.  Lyrica was a new medication.    - Mental status improved 12/19.  Back to baseline on 12/20   - Lyrica and trazodone were discontinued        Acute thrombocytopenia, platelets 174 --> 98 (12/18) --> 192 (12/19)-resolved  -Most likely due to consumption from pulmonary embolism or platelet clumping.  Platelets in normal range on 12/19  -Low probability for heparin-induced thrombocytopenia.  T4 score of 3. HIT screen negative        Acute anemia, hemoglobin 13.2 --> 10.3  -Hemoglobin has declined from 13.2 to 10.3.  This is most likely secondary to pulmonary embolism and hemodilution.  No other obvious bleed noted.  Hemoglobin is now stable       Intermittent  nausea/vomiting  Duodenal ulcers -on EGD 12/3/2022  Poor PO intake  Failure to thrive  - has had recent hospitalizations for episodic nausea and vomiting.   - Was admitted from 11/1 to 11/15/22, underwent EGD on 12/3/22 by Netta -found to have small superficial duodenal ulcers. Prilosec was changed to Protonix BID.   - she has continued to have intermittent nausea and vomiting and has not been able to take her medications or maintain good oral intake.   - [facility] consulted.  As next step, CT abdomen/pelvis with IV and oral contrast could be obtained.  This was deferred as patient shows hospice at discharge and her oral intake improved while hospitalized.  - Continue Protonix p.o. twice daily dose   - PRN Zofran for nausea      Diabetes mellitus type II with long-term use of insulin, with peripheral neuropathy  - PTA on Lantus and NovoLog  -Due to limited oral intake, NovoLog discontinued.  Lantus decreased to 10 units daily       Essential hypertension  Dyslipidemia  Chronic paroxysmal atrial fibrillation  Chronic diastolic CHF with preserved EF of 65-70%, echo 12/15/2022  - Blood pressure ok   - Continue diltiazem, Imdur, spironolactone  - PTA Bumex discontinued due to poor PO intake and dehydration  - Discontinued rosuvastatin  - continue Eliquis       Hypothyroidism  - continue PTA Synthroid     Anxiety/depression  Parkinson's disease  -Continue Sinemet, Celexa, Wellbutrin, Aricept, Topamax  -Trazodone discontinued     Asthma/COPD  - respiratory status stable despite saddle PE  - Continue inhalers including Symbicort     CKD stage III  -BUN 37, creatinine 0.89;  stable      Hypokalemia, potassium 3  -Replaced      Hypovolemic hyponatremia: Na = 134 mmol/L   -Improved to 142 with IV fluids.  Now down to 137     Mild hypercalcemia: corrected calcium is >10.1, most likely secondary to hypovolemia  -Now improved and in normal range     Hypoalbuminemia: albumin = 2.8 g/dL            Obesity: Estimated body mass index is  32.72 kg/m  as calculated from the following:    Height as of this encounter: 1.524 m (5').    Weight as of this encounter: 76 kg (167 lb 8.8 oz)., PRESENT ON ADMISSION      Urinary retention, s/p Hussein catheter placement  -Hussein continued at discharge     Goals of care  Palliative care consulted with patient on 12/15/2021.    Patient elected comfort focused care with plan to enroll in hospice at discharge.  She was discharged to facility with hospice           Follow-ups Needed After Discharge   Follow-up Appointments     Follow Up and recommended labs and tests      Follow up with primary care provider in 1 week .  No follow up labs or   test are needed.         {Additional follow-up instructions/to-do's for PCP    :    Unresulted Labs Ordered in the Past 30 Days of this Admission     No orders found from 11/14/2022 to 12/15/2022.          Discharge Disposition   Discharged to long-term care facility  Condition at discharge: Stable        Consultations This Hospital Stay   PHARMACY IP CONSULT  PHARMACY IP CONSULT  GASTROENTEROLOGY IP CONSULT  PALLIATIVE CARE ADULT IP CONSULT  INTERVENTIONAL RADIOLOGY ADULT/PEDS IP CONSULT  CARE MANAGEMENT / SOCIAL WORK IP CONSULT  PHYSICAL THERAPY ADULT IP CONSULT  OCCUPATIONAL THERAPY ADULT IP CONSULT  NUTRITION SERVICES ADULT IP CONSULT  CARE MANAGEMENT / SOCIAL WORK IP CONSULT  SPIRITUAL HEALTH SERVICES IP CONSULT  PHARMACY IP CONSULT  PHARMACY TO DOSE ARGATROBAN  VASCULAR ACCESS ADULT IP CONSULT    Code Status   No CPR- Do NOT Intubate    Time Spent on this Encounter   I, Whitney Lyles MD, personally saw the patient today and spent greater than 30 minutes discharging this patient.       Whitney Lyles MD  [facility]  [address] [place], [place] [address], [address]  [facility] [address]  Phone: [phone]  ______________________________________________________________________    Physical Exam   Vital Signs: Temp: 98.9  F (37.2  C) Temp src: Oral BP: (!)  162/81 Pulse: 72   Resp: 18 SpO2: 95 % O2 Device: None (Room air)    Weight: 173 lbs 14.73 oz    Constitutional-patient is awake and alert, sitting in bed, in no acute distress  Cardiovascular-regular rate and rhythm, no murmurs, no edema  Pulmonary-lungs are clear to auscultation bilaterally, no wheezing or rhonchi  GI-abdomen is soft, nontender, nondistended, no hepatosplenomegaly or masses  Integumentary-skin is warm and dry, no rashes or ulcers  Neurological- Moving all 4 extremities, normal speech, no focal deficits             Primary Care Physician   Mary Kay Flannery    Discharge Orders      General info for SNF    Length of Stay Estimate: Long Term Care  Condition at Discharge: Stable  Level of care:board and care  Rehabilitation Potential: Fair  Admission H&P remains valid and up-to-date: Yes  Recent Chemotherapy: N/A  Use Nursing Home Standing Orders: Yes     Mantoux instructions    Give two-step Mantoux (PPD) Per Facility Policy Yes     Follow Up and recommended labs and tests    Follow up with primary care provider in 1 week .  No follow up labs or test are needed.     Reason for your hospital stay    Pulmonary embolism     Glucose monitor nursing POCT    Before meals and at bedtime     Activity - Up with assistive device     Activity - Up with nursing assistance     Hussein catheter    To straight gravity drainage. Change catheter every 2 weeks and PRN for leaking or decreased urine output with signs of bladder distention. DO NOT change catheter without a specific Provider order IF diagnosis of benign prostatic hypertrophy (BPH), neurogenic bladder, or other urological conditions     Fall precautions     Diet    Follow this diet upon discharge: Regular       Significant Results and Procedures   Results for orders placed or performed during the hospital encounter of 12/14/22   CT Head w/o Contrast    Narrative    CT SCAN OF THE HEAD WITHOUT CONTRAST   12/14/2022 3:11 PM     HISTORY: On Eliquis.  Significant weakness. Evaluate for spontaneous  bleed.    TECHNIQUE: Axial images of the head and coronal reformations without  IV contrast material. Radiation dose for this scan was reduced using  automated exposure control, adjustment of the mA and/or kV according  to patient size, or iterative reconstruction technique.    COMPARISON: None.    FINDINGS: The supratentorial ventricular system appears mild to  moderately dilated, slightly disproportionate to the degree of sulcal  prominence. This is likely related to ex vacuo dilatation in the  setting of central white matter volume loss/atrophy. A superimposed  component of communicating hydrocephalus is thought to be less likely.  Mild generalized brain parenchymal volume loss. Mild patchy  nonspecific hypoattenuation of the cerebral white matter, likely due  to chronic small vessel ischemic disease. Small area of  gliosis/encephalomalacia involving the cortex and subcortical white  matter of the left occipital lobe, which may be due to chronic  infarct. There is a subtle small area of nonspecific hyperattenuation  in the subcortical white matter of the left parietal lobe measuring up  to 8-9 mm (series 3 image 19, series 5 image 40), nonspecific.  Scattered atherosclerotic calcifications are present. No extra axial  fluid collection or significant mass effect/herniation.    The visualized portions of the sinuses and mastoids appear normal. The  bony calvarium and bones of the skull base appear intact. Ovoid  circumscribed partially calcified lesion in the subcutaneous soft  tissues overlying the right parietal calvarium (series 5 image 37)  measuring 8-9 mm probably representing a sebaceous cyst or epidermal  inclusion cyst.      Impression    IMPRESSION:  1. Subtle irregular small focus of hyperattenuation involving the  subcortical white matter of the left parietal lobe, nonspecific. This  could possibly represent a vascular lesion such as a  cavernous  malformation, but a subtle focus of recent hemorrhage or small  intra-axial mass cannot be excluded. Recommend MRI of the brain  without and with contrast for further evaluation.  2. 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  3. Generalized brain atrophy and presumed chronic small vessel  ischemic change, as described.  4. Mild to moderate supratentorial ventriculomegaly. This is favored  to be due to ex vacuo dilatation in the setting of central white  matter volume loss and brain atrophy. A superimposed component of  communicating hydrocephalus is less likely, but not entirely excluded.    PEDRO BYERS MD         SYSTEM ID:  [id number]   MR Brain w/o & w Contrast    Narrative    EXAM: MR BRAIN W/O and W CONTRAST  LOCATION: [facility]  DATE/TIME: 12/14/2022 5:52 PM    INDICATION: Abnormality on CT scan recommended MRI by radiology  COMPARISON:  Same day CT head without contrast, CT head 04/25/2021.  CONTRAST: 8mL Gadavist  TECHNIQUE: Routine multiplanar multisequence head MRI without and with intravenous contrast.    FINDINGS:  INTRACRANIAL CONTENTS: 8 mm region of susceptibility in the left parietal lobe with hyperintense T1 and T2 signal, compatible with a cavernous malformation. No surrounding edema or findings of recent hemorrhage. Additional scattered remote   microhemorrhages, with one in the right anterior temporal lobe which could also represent a small cavernous malformation. No acute or subacute infarct. No acute hemorrhage or extra-axial fluid collections. Patchy nonspecific T2/FLAIR hyperintensities   within the cerebral white matter most consistent with mild to moderate chronic microvascular ischemic change. Small areas of encephalomalacia involving the bilateral precentral gyri and left occipital lobe. Mild generalized cerebral atrophy. No   hydrocephalus. Normal position of the cerebellar tonsils. No pathologic contrast enhancement.    SELLA: No abnormality  accounting for technique.    OSSEOUS STRUCTURES/SOFT TISSUES: Normal marrow signal. The major intracranial vascular flow voids are maintained. Incidental pontine capillary telangiectasia.    ORBITS: No abnormality accounting for technique.     SINUSES/MASTOIDS: No paranasal sinus mucosal disease. No middle ear or mastoid effusion.       Impression    IMPRESSION:  1.  Small left parietal cavernous malformation without surrounding edema or findings of recent hemorrhage. This corresponds to the finding on recent CT.  2.  Otherwise, age-related changes without acute intracranial abnormality.   CT Chest Pulmonary Embolism w Contrast     Value    Radiologist flags (AA)     Sagittal embolism with large pulmonary embolism noted bilaterally with moderate right heart strain.    Narrative    EXAM: CT CHEST PULMONARY EMBOLISM W CONTRAST  LOCATION: [facility]  DATE/TIME: 12/14/2022 6:31 PM    INDICATION: Elevated D dimer, BNP and troponin  COMPARISON: None.  TECHNIQUE: CT chest pulmonary angiogram during arterial phase injection of IV contrast. Multiplanar reformats and MIP reconstructions were performed. Dose reduction techniques were used.   CONTRAST: 69 mL Isovue 370    FINDINGS:  ANGIOGRAM CHEST: Large amount of pulmonary embolism is noted with saddle embolism with scoliosis of the right and left main pulmonary artery. Large amount of embolism is noted in both the right and left main pulmonary artery extending into the lobar and   segmental branches of all lobes. Thoracic aorta is negative for dissection. Moderate right heart strain.    LUNGS AND PLEURA: Minimal bibasilar atelectasis. No focal consolidation or effusion.    MEDIASTINUM/AXILLAE: Heart is normal in size. No adenopathy.    CORONARY ARTERY CALCIFICATION: None.    UPPER ABDOMEN: Normal.    MUSCULOSKELETAL: Degenerative changes of the spine.      Impression    IMPRESSION:  1.  Saddle pulmonary embolism with large amount of thrombus noted  in both the right and left main pulmonary arteries extending into the lobar and segmental branches of all lobes. Moderate right heart strain. Recommend consultation with interventional   radiology for further evaluation and management.      [Critical Result: Sagittal embolism with large pulmonary embolism noted bilaterally with moderate right heart strain.]    Finding was identified on 12/14/2022 6:32 PM.     Dr. Ramirez was contacted by me on 12/14/2022 6:38 PM and verbalized understanding of the critical result.      US Lower Extremity Venous Duplex Bilateral    Narrative    EXAM: US LOWER EXTREMITY VENOUS DUPLEX BILATERAL  LOCATION: [facility]  DATE/TIME: 12/14/2022 10:58 PM    INDICATION: Saddle pulmonary embolism.  COMPARISON: None.  TECHNIQUE: Venous Duplex ultrasound of bilateral lower extremities with and without compression, augmentation and duplex. Color flow and spectral Doppler with waveform analysis performed.    FINDINGS: Exam includes the common femoral, femoral, popliteal veins as well as segmentally visualized deep calf veins and greater saphenous vein.     RIGHT: No deep vein thrombosis. No superficial thrombophlebitis. No popliteal cyst.    LEFT: Nonocclusive DVT in the left popliteal, posterior tibial, and peroneal veins. No superficial thrombophlebitis. No popliteal cyst.      Impression    IMPRESSION:  1.  LEFT LEG: Positive for nonocclusive DVT in the popliteal, posterior tibial, and peroneal veins.  2.  RIGHT LEG: Negative for DVT.    I discussed the findings with the patient's nurse, Belia, at 11:30 PM on 12/14/2022.   CT Head w/o Contrast    Narrative    EXAM: CT HEAD WITHOUT CONTRAST  LOCATION: [facility]  DATE/TIME: 12/18/2022, 12:09 PM    INDICATION: Obtunded today, is on anticoagulation.  COMPARISON: Brain MRI and head CT 12/14/2022.  TECHNIQUE: Routine CT Head without IV contrast. Multiplanar reformats. Dose reduction techniques were  used.    FINDINGS:  INTRACRANIAL CONTENTS: No intracranial hemorrhage, extra-axial collection, or mass effect.  No CT evidence of acute infarct. Chronic left occipital infarct. Mild presumed chronic small vessel ischemic changes. Mild to moderate generalized volume loss.   Small presumed cavernoma in the subcortical left parietal white matter..     VISUALIZED ORBITS/SINUSES/MASTOIDS: No intraorbital abnormality. No paranasal sinus mucosal disease. No middle ear or mastoid effusion.    BONES/SOFT TISSUES: No acute abnormality.      Impression    IMPRESSION:  1.  No acute intracranial finding or interval change.       Echocardiogram Complete     Value    LVEF  65-70%    [facility]    [id number]  [id number]  [id number]  [id number]^KYE^SONIA^MARIANA     [facility]  Echocardiography Laboratory  [address] [place] [address]     Name: KIRK IBARRA  MRN: [id number]  : 1950  Study Date: 12/15/2022 10:02 AM  Age: 72 yrs  Gender: Female  Patient Location: [facility]  Reason For Study: Pulmonary Emboli  Ordering Physician: SONIA FISHMAN  Referring Physician: SONIA FISHMAN  Performed By: Ángel Liao     BSA: 1.8 m2  Height: 60 in  Weight: 180 lb  HR: 66  BP: 138/96 mmHg  ______________________________________________________________________________  Procedure  Complete Echo Adult.  ______________________________________________________________________________  Interpretation Summary     Left ventricular systolic function is normal.  The visual ejection fraction is 65-70%.  No regional wall motion abnormalities noted.  There is mild (1+) aortic regurgitation.  There is mild (1+) mitral regurgitation.  The right ventricle is normal size.  The right ventricular systolic function is normal.  The study was technically difficult. There is no comparison study available.  ______________________________________________________________________________  Left Ventricle  The left ventricle is  normal in size. There is normal left ventricular wall  thickness. Left ventricular systolic function is normal. The visual ejection  fraction is 65-70%. Grade I or early diastolic dysfunction. No regional wall  motion abnormalities noted.     Right Ventricle  The right ventricle is normal size. The right ventricular systolic function is  normal.     Atria  The left atrium is mildly dilated. Right atrial size is normal.     Mitral Valve  There is mild mitral annular calcification. The mitral valve leaflets are  mildly thickened. There is mild (1+) mitral regurgitation.     Tricuspid Valve  No tricuspid regurgitation. Right ventricular systolic pressure could not be  approximated due to inadequate tricuspid regurgitation. IVC diameter <2.1 cm  collapsing >50% with sniff suggests a normal RA pressure of 3 mmHg.     Aortic Valve  There is mild trileaflet aortic sclerosis. There is mild (1+) aortic  regurgitation. No aortic stenosis is present.     Pulmonic Valve  The pulmonic valve is not well visualized.     Vessels  The aortic root is normal size.     Pericardium  There is no pericardial effusion.     Rhythm  Sinus rhythm was noted.  ______________________________________________________________________________  MMode/2D Measurements & Calculations  IVSd: 1.0 cm     LVIDd: 4.0 cm  LVIDs: 2.5 cm  LVPWd: 0.93 cm  FS: 37.7 %  LV mass(C)d: 123.9 grams  LV mass(C)dI: 69.4 grams/m2  Ao root diam: 2.8 cm  asc Aorta Diam: 3.3 cm  LVOT diam: 2.0 cm  LVOT area: 3.0 cm2  LA Volume (BP): 65.4 ml  LA Volume Index (BP): 36.7 ml/m2  RWT: 0.46     Doppler Measurements & Calculations  MV E max arron: 77.1 cm/sec  MV A max arron: 122.7 cm/sec  MV E/A: 0.63  MV max P.9 mmHg  MV mean P.6 mmHg  MV V2 VTI: 37.2 cm  MV dec slope: 246.1 cm/sec2  MV dec time: 0.31 sec  Ao V2 max: 165.1 cm/sec  Ao max P.0 mmHg  Ao V2 mean: 118.5 cm/sec  Ao mean P.2 mmHg  Ao V2 VTI: 32.5 cm  SOLANGE(V,D): 2.8 cm2  AI P1/2t: 454.3 msec  LV V1 max PG:  9.4 mmHg  LV V1 max: 153.6 cm/sec  PA V2 max: 102.6 cm/sec  PA max P.2 mmHg  PA acc time: 0.08 sec  AV Ashutosh Ratio (DI): 0.93  Lateral E/e': 9.2     ______________________________________________________________________________  Report approved by: Cristóbal Horowitz 12/15/2022 11:31 AM               Discharge Medications   Current Discharge Medication List      START taking these medications    Details   HYDROmorphone (DILAUDID) 2 MG tablet Take 0.5 tablets (1 mg) by mouth every 6 hours as needed for severe pain (7-10) or shortness of breath  Qty: 20 tablet, Refills: 0    Associated Diagnoses: Hospice care patient      LORazepam (ATIVAN) 0.5 MG tablet Take 1 tablet (0.5 mg) by mouth or place under tongue every 4 hours as needed for anxiety (restlessness)  Qty: 18 tablet, Refills: 0    Associated Diagnoses: Hospice care patient      ondansetron (ZOFRAN ODT) 8 MG ODT tab Take 1 tablet (8 mg) by mouth every 8 hours as needed for nausea  Qty: 12 tablet, Refills: 0    Associated Diagnoses: Nausea         CONTINUE these medications which have CHANGED    Details   insulin glargine (LANTUS PEN) 100 UNIT/ML pen Inject 10 Units Subcutaneous At Bedtime  Qty: 15 mL, Refills: 0    Comments: If Lantus is not covered by insurance, may substitute Basaglar or Semglee or other insulin glargine product per insurance preference at same dose and frequency.    Associated Diagnoses: Type 2 diabetes mellitus with diabetic autonomic neuropathy, with long-term current use of insulin (H)         CONTINUE these medications which have NOT CHANGED    Details   acetaminophen (TYLENOL) 32 mg/mL liquid Take 992 mg by mouth every 8 hours as needed for fever or mild pain      albuterol (PROAIR HFA/PROVENTIL HFA/VENTOLIN HFA) 108 (90 Base) MCG/ACT inhaler Inhale 2 puffs into the lungs every 6 hours as needed for shortness of breath, wheezing or cough      apixaban ANTICOAGULANT (ELIQUIS) 5 MG tablet TAKE 1 TABLET BY MOUTH TWICE DAILY       budesonide-formoterol (SYMBICORT) 160-4.5 MCG/ACT Inhaler INHALE 2 PUFFS BY MOUTH TWICE DAILY. RINSE MOUTH/ GARGLE AFTER USE      buPROPion (WELLBUTRIN XL) 300 MG 24 hr tablet Take 300 mg by mouth every morning      carbidopa-levodopa (SINEMET CR)  MG CR tablet Take 1 tablet by mouth At Bedtime      carbidopa-levodopa (SINEMET)  MG tablet Take 2 tablets by mouth 4 times daily      citalopram (CELEXA) 20 MG tablet Take 40 mg by mouth daily      diclofenac (VOLTAREN) 1 % topical gel Apply topically 4 times daily as needed for moderate pain (4-6)      diltiazem ER (DILT-XR) 180 MG 24 hr capsule Take 180 mg by mouth daily      donepezil (ARICEPT) 10 MG tablet Take 10 mg by mouth At Bedtime      fluticasone (FLONASE) 50 MCG/ACT nasal spray SHAKE LIQUID AND USE 2 SPRAYS IN EACH NOSTRIL DAILY      hydrOXYzine (ATARAX) 10 MG tablet Take 10 mg by mouth 3 times daily as needed for itching      isosorbide mononitrate (IMDUR) 30 MG 24 hr tablet Take 30 mg by mouth daily      levothyroxine (SYNTHROID/LEVOTHROID) 88 MCG tablet Take 88 mcg by mouth daily      nystatin (MYCOSTATIN) 119582 UNIT/GM external cream Apply topically daily as needed      pantoprazole (PROTONIX) 20 MG EC tablet Take 40 mg by mouth 2 times daily (with meals)      senna-docusate (SENOKOT-S/PERICOLACE) 8.6-50 MG tablet Take 2 tablets by mouth daily as needed      spironolactone (ALDACTONE) 25 MG tablet Take 25 mg by mouth daily      topiramate (TOPAMAX) 50 MG tablet Take 50 mg by mouth 3 times daily      blood glucose (ACCU-CHEK GUIDE) test strip Use one test strip to monitor blood glucose six times daily.      Blood Glucose Monitoring Suppl (ACCU-CHEK GUIDE ME) w/Device KIT Use as directed.      Continuous Blood Gluc Sensor (FREESTYLE BETY 2 SENSOR) MISC 1 each      insulin pen needle (NOVOFINE PLUS) 32G X 4 MM miscellaneous Inject 5 each Subcutaneous      Lancets MISC 1 each      sodium fluoride dental gel (PREVIDENT) 1.1 % GEL topical gel  BRUSH BID QAM AND PM. NO WATER RINSE AFTER         STOP taking these medications       bumetanide (BUMEX) 1 MG tablet Comments:   Reason for Stopping:         insulin lispro (HUMALOG KWIKPEN) 100 UNIT/ML (1 unit dial) KWIKPEN Comments:   Reason for Stopping:         naltrexone (REVIA) 1 mg/mL SOLN Comments:   Reason for Stopping:         naproxen (NAPROSYN) 500 MG tablet Comments:   Reason for Stopping:         ondansetron (ZOFRAN) 4 MG tablet Comments:   Reason for Stopping:         oxyCODONE (ROXICODONE) 5 MG tablet Comments:   Reason for Stopping:         rivastigmine (EXELON) 4.6 MG/24HR 24 hr patch Comments:   Reason for Stopping:         rosuvastatin (CRESTOR) 5 MG tablet Comments:   Reason for Stopping:         traZODone (DESYREL) 50 MG tablet Comments:   Reason for Stopping:             Allergies   Allergies   Allergen Reactions     Ciprofloxacin Hives and Rash     Erythromycin Hives and Rash     Latex Unknown     Other reaction(s): *Unknown     Penicillins Hives     However she tolerates cephalexin (Kelfex)       Atorvastatin Muscle Pain (Myalgia)     muscle weakness  muscle weakness       Exenatide Diarrhea     Other reaction(s): Gastrointestinal     Liraglutide Nausea and Vomiting     Did not tolerate, even with low dose.      Metformin Diarrhea and Nausea     Oxybutynin Other (See Comments)     Dry mouth, nausea, confusion     Pravastatin      Other reaction(s): Myalgias     Sulfa Drugs Hives

## 2022-12-22 NOTE — PROGRESS NOTES
[facility] [place]    Background: Transitional Care Management program identified per system criteria and reviewed by [facility] team for possible outreach.    Assessment: Upon chart review, [facility] Team member will not proceed with patient outreach related to this episode of Transitional Care Management program due to reason below:    Non-[facility] TCU: Patient is not established within [facility] and [facility] team member noted patient discharged to TCU/ARU/LTACH.    Plan: Transitional Care Management episode addressed appropriately per reason noted above.      Sandeep Trejo  [facility] [place], [facility]    *[facility] Care Resource Team does NOT follow patient ongoing. Referrals are identified based on internal discharge reports and the outreach is to ensure patient has an understanding of their discharge instructions.

## 2023-01-01 ENCOUNTER — HEALTH MAINTENANCE LETTER (OUTPATIENT)
Age: 73
End: 2023-01-01

## 2023-08-20 ENCOUNTER — HEALTH MAINTENANCE LETTER (OUTPATIENT)
Age: 73
End: 2023-08-20

## 2023-09-25 NOTE — PROVIDER NOTIFICATION
MD Notification    Notified Person: MD    Notified Person Name: Adair Ceron    Notification Date/Time: 12/17     Notification Interaction: Page    Purpose of Notification: - Pt is reporting generalized severe pain in feet/body. Repositioned, PRN tylenol given w/o relief. PTA oxycodone?       Orders Received: Sinemet ordered - given     Comments:         Pt here for C9 D1 Trazimera. Arrives Ambulating independently, accompanied by Self       Oral medications included in this regimen:  no    Patient confirms comprehension of cancer treatment schedule:  yes    Pregnancy screening:  Not applicable    Modifications in dose or schedule:  No    Medications appearance and physical integrity checked by RN. Chemotherapy IV pump settings verified by 2 RNs:  yes     Frequency of blood return and site check throughout administration: Prior to administration and At completion of therapy     Infusion/treatment outcome:  patient tolerated treatment without incident    Education Record    Learner:  Patient  Barriers / Limitations:  None  Method:  Discussion  Education / instructions given:  Reviewed plan of care and follow up appts.   Outcome:  Shows understanding    Discharged Home, Ambulating independently, accompanied by:Self    Patient/family verbalized understanding of future appointments: by [facility]